# Patient Record
Sex: MALE | Race: WHITE | Employment: STUDENT | URBAN - METROPOLITAN AREA
[De-identification: names, ages, dates, MRNs, and addresses within clinical notes are randomized per-mention and may not be internally consistent; named-entity substitution may affect disease eponyms.]

---

## 2021-01-03 ENCOUNTER — HOSPITAL ENCOUNTER (OUTPATIENT)
Facility: CLINIC | Age: 26
Setting detail: OBSERVATION
Discharge: HOME | End: 2021-01-05
Attending: EMERGENCY MEDICINE | Admitting: ORTHOPAEDIC SURGERY
Payer: MEDICAID

## 2021-01-03 ENCOUNTER — APPOINTMENT (OUTPATIENT)
Dept: GENERAL RADIOLOGY | Facility: CLINIC | Age: 26
End: 2021-01-03
Attending: EMERGENCY MEDICINE
Payer: MEDICAID

## 2021-01-03 DIAGNOSIS — S82.892A CLOSED FRACTURE OF LEFT ANKLE, INITIAL ENCOUNTER: ICD-10-CM

## 2021-01-03 PROCEDURE — 73610 X-RAY EXAM OF ANKLE: CPT | Mod: LT

## 2021-01-03 PROCEDURE — 96361 HYDRATE IV INFUSION ADD-ON: CPT

## 2021-01-03 PROCEDURE — 27808 TREATMENT OF ANKLE FRACTURE: CPT | Mod: LT

## 2021-01-03 PROCEDURE — C9803 HOPD COVID-19 SPEC COLLECT: HCPCS

## 2021-01-03 PROCEDURE — 85025 COMPLETE CBC W/AUTO DIFF WBC: CPT | Performed by: EMERGENCY MEDICINE

## 2021-01-03 PROCEDURE — 87635 SARS-COV-2 COVID-19 AMP PRB: CPT | Performed by: EMERGENCY MEDICINE

## 2021-01-03 PROCEDURE — 99291 CRITICAL CARE FIRST HOUR: CPT | Mod: 25

## 2021-01-03 PROCEDURE — 96374 THER/PROPH/DIAG INJ IV PUSH: CPT

## 2021-01-03 PROCEDURE — 80048 BASIC METABOLIC PNL TOTAL CA: CPT | Performed by: EMERGENCY MEDICINE

## 2021-01-03 PROCEDURE — 250N000011 HC RX IP 250 OP 636: Performed by: EMERGENCY MEDICINE

## 2021-01-03 RX ORDER — PROPOFOL 10 MG/ML
0.1 INJECTION, EMULSION INTRAVENOUS
Status: DISCONTINUED | OUTPATIENT
Start: 2021-01-03 | End: 2021-01-04

## 2021-01-03 RX ORDER — NALOXONE HYDROCHLORIDE 0.4 MG/ML
0.4 INJECTION, SOLUTION INTRAMUSCULAR; INTRAVENOUS; SUBCUTANEOUS
Status: DISCONTINUED | OUTPATIENT
Start: 2021-01-03 | End: 2021-01-04

## 2021-01-03 RX ORDER — NALOXONE HYDROCHLORIDE 0.4 MG/ML
0.2 INJECTION, SOLUTION INTRAMUSCULAR; INTRAVENOUS; SUBCUTANEOUS
Status: DISCONTINUED | OUTPATIENT
Start: 2021-01-03 | End: 2021-01-04

## 2021-01-03 RX ORDER — PROPOFOL 10 MG/ML
1 INJECTION, EMULSION INTRAVENOUS ONCE
Status: COMPLETED | OUTPATIENT
Start: 2021-01-03 | End: 2021-01-04

## 2021-01-03 RX ADMIN — HYDROMORPHONE HYDROCHLORIDE 1 MG: 1 INJECTION, SOLUTION INTRAMUSCULAR; INTRAVENOUS; SUBCUTANEOUS at 21:38

## 2021-01-03 ASSESSMENT — ENCOUNTER SYMPTOMS
BACK PAIN: 0
ABDOMINAL PAIN: 0
NECK PAIN: 0

## 2021-01-04 ENCOUNTER — APPOINTMENT (OUTPATIENT)
Dept: GENERAL RADIOLOGY | Facility: CLINIC | Age: 26
End: 2021-01-04
Attending: ORTHOPAEDIC SURGERY
Payer: MEDICAID

## 2021-01-04 ENCOUNTER — ANESTHESIA (OUTPATIENT)
Dept: SURGERY | Facility: CLINIC | Age: 26
End: 2021-01-04
Payer: MEDICAID

## 2021-01-04 ENCOUNTER — ANESTHESIA EVENT (OUTPATIENT)
Dept: SURGERY | Facility: CLINIC | Age: 26
End: 2021-01-04
Payer: MEDICAID

## 2021-01-04 ENCOUNTER — APPOINTMENT (OUTPATIENT)
Dept: GENERAL RADIOLOGY | Facility: CLINIC | Age: 26
End: 2021-01-04
Attending: EMERGENCY MEDICINE
Payer: MEDICAID

## 2021-01-04 ENCOUNTER — APPOINTMENT (OUTPATIENT)
Dept: GENERAL RADIOLOGY | Facility: CLINIC | Age: 26
End: 2021-01-04
Attending: HOSPITALIST
Payer: MEDICAID

## 2021-01-04 PROBLEM — S82.892A CLOSED FRACTURE OF LEFT ANKLE, INITIAL ENCOUNTER: Status: ACTIVE | Noted: 2021-01-04

## 2021-01-04 LAB
ANION GAP SERPL CALCULATED.3IONS-SCNC: 4 MMOL/L (ref 3–14)
BASOPHILS # BLD AUTO: 0 10E9/L (ref 0–0.2)
BASOPHILS NFR BLD AUTO: 0.2 %
BUN SERPL-MCNC: 13 MG/DL (ref 7–30)
CALCIUM SERPL-MCNC: 8.7 MG/DL (ref 8.5–10.1)
CHLORIDE SERPL-SCNC: 108 MMOL/L (ref 94–109)
CO2 SERPL-SCNC: 25 MMOL/L (ref 20–32)
CREAT SERPL-MCNC: 0.9 MG/DL (ref 0.66–1.25)
CREAT SERPL-MCNC: 0.98 MG/DL (ref 0.66–1.25)
DIFFERENTIAL METHOD BLD: ABNORMAL
EOSINOPHIL # BLD AUTO: 0 10E9/L (ref 0–0.7)
EOSINOPHIL NFR BLD AUTO: 0.1 %
ERYTHROCYTE [DISTWIDTH] IN BLOOD BY AUTOMATED COUNT: 11.8 % (ref 10–15)
GFR SERPL CREATININE-BSD FRML MDRD: >90 ML/MIN/{1.73_M2}
GFR SERPL CREATININE-BSD FRML MDRD: >90 ML/MIN/{1.73_M2}
GLUCOSE SERPL-MCNC: 90 MG/DL (ref 70–99)
HCT VFR BLD AUTO: 40.8 % (ref 40–53)
HGB BLD-MCNC: 13.6 G/DL (ref 13.3–17.7)
IMM GRANULOCYTES # BLD: 0 10E9/L (ref 0–0.4)
IMM GRANULOCYTES NFR BLD: 0.3 %
LABORATORY COMMENT REPORT: NORMAL
LYMPHOCYTES # BLD AUTO: 1.5 10E9/L (ref 0.8–5.3)
LYMPHOCYTES NFR BLD AUTO: 11.5 %
MCH RBC QN AUTO: 31 PG (ref 26.5–33)
MCHC RBC AUTO-ENTMCNC: 33.3 G/DL (ref 31.5–36.5)
MCV RBC AUTO: 93 FL (ref 78–100)
MONOCYTES # BLD AUTO: 0.6 10E9/L (ref 0–1.3)
MONOCYTES NFR BLD AUTO: 5 %
NEUTROPHILS # BLD AUTO: 10.5 10E9/L (ref 1.6–8.3)
NEUTROPHILS NFR BLD AUTO: 82.9 %
NRBC # BLD AUTO: 0 10*3/UL
NRBC BLD AUTO-RTO: 0 /100
PLATELET # BLD AUTO: 294 10E9/L (ref 150–450)
POTASSIUM SERPL-SCNC: 3.6 MMOL/L (ref 3.4–5.3)
RBC # BLD AUTO: 4.39 10E12/L (ref 4.4–5.9)
SARS-COV-2 RNA SPEC QL NAA+PROBE: NEGATIVE
SODIUM SERPL-SCNC: 137 MMOL/L (ref 133–144)
SPECIMEN SOURCE: NORMAL
WBC # BLD AUTO: 12.6 10E9/L (ref 4–11)

## 2021-01-04 PROCEDURE — 250N000011 HC RX IP 250 OP 636: Performed by: EMERGENCY MEDICINE

## 2021-01-04 PROCEDURE — 272N000001 HC OR GENERAL SUPPLY STERILE: Performed by: ORTHOPAEDIC SURGERY

## 2021-01-04 PROCEDURE — 250N000013 HC RX MED GY IP 250 OP 250 PS 637: Performed by: HOSPITALIST

## 2021-01-04 PROCEDURE — 999N000065 XR ANKLE PORT LT 2 VW: Mod: LT

## 2021-01-04 PROCEDURE — 258N000003 HC RX IP 258 OP 636: Performed by: ANESTHESIOLOGY

## 2021-01-04 PROCEDURE — 250N000009 HC RX 250: Performed by: NURSE ANESTHETIST, CERTIFIED REGISTERED

## 2021-01-04 PROCEDURE — 250N000011 HC RX IP 250 OP 636: Performed by: ANESTHESIOLOGY

## 2021-01-04 PROCEDURE — 250N000011 HC RX IP 250 OP 636: Performed by: NURSE ANESTHETIST, CERTIFIED REGISTERED

## 2021-01-04 PROCEDURE — G0378 HOSPITAL OBSERVATION PER HR: HCPCS

## 2021-01-04 PROCEDURE — 999N000179 XR SURGERY CARM FLUORO LESS THAN 5 MIN W STILLS

## 2021-01-04 PROCEDURE — 36415 COLL VENOUS BLD VENIPUNCTURE: CPT | Performed by: ORTHOPAEDIC SURGERY

## 2021-01-04 PROCEDURE — C1769 GUIDE WIRE: HCPCS | Performed by: ORTHOPAEDIC SURGERY

## 2021-01-04 PROCEDURE — 258N000003 HC RX IP 258 OP 636: Performed by: EMERGENCY MEDICINE

## 2021-01-04 PROCEDURE — 258N000003 HC RX IP 258 OP 636: Performed by: ORTHOPAEDIC SURGERY

## 2021-01-04 PROCEDURE — 99220 PR INITIAL OBSERVATION CARE,LEVEL III: CPT | Performed by: HOSPITALIST

## 2021-01-04 PROCEDURE — 370N000017 HC ANESTHESIA TECHNICAL FEE, PER MIN: Performed by: ORTHOPAEDIC SURGERY

## 2021-01-04 PROCEDURE — 96375 TX/PRO/DX INJ NEW DRUG ADDON: CPT

## 2021-01-04 PROCEDURE — 250N000009 HC RX 250: Performed by: ORTHOPAEDIC SURGERY

## 2021-01-04 PROCEDURE — 360N000084 HC SURGERY LEVEL 4 W/ FLUORO, PER MIN: Performed by: ORTHOPAEDIC SURGERY

## 2021-01-04 PROCEDURE — 99207 PR CDG-MDM COMPONENT: MEETS HIGH - UP CODED: CPT | Performed by: HOSPITALIST

## 2021-01-04 PROCEDURE — C1713 ANCHOR/SCREW BN/BN,TIS/BN: HCPCS | Performed by: ORTHOPAEDIC SURGERY

## 2021-01-04 PROCEDURE — 250N000011 HC RX IP 250 OP 636: Performed by: HOSPITALIST

## 2021-01-04 PROCEDURE — 250N000013 HC RX MED GY IP 250 OP 250 PS 637: Performed by: ORTHOPAEDIC SURGERY

## 2021-01-04 PROCEDURE — 250N000011 HC RX IP 250 OP 636: Performed by: ORTHOPAEDIC SURGERY

## 2021-01-04 PROCEDURE — 250N000011 HC RX IP 250 OP 636: Performed by: PHYSICIAN ASSISTANT

## 2021-01-04 PROCEDURE — 82565 ASSAY OF CREATININE: CPT | Performed by: ORTHOPAEDIC SURGERY

## 2021-01-04 PROCEDURE — 710N000009 HC RECOVERY PHASE 1, LEVEL 1, PER MIN: Performed by: ORTHOPAEDIC SURGERY

## 2021-01-04 PROCEDURE — 999N000063 XR TIBIA & FIBULA PORT LT 2 VW: Mod: LT

## 2021-01-04 PROCEDURE — 250N000025 HC SEVOFLURANE, PER MIN: Performed by: ORTHOPAEDIC SURGERY

## 2021-01-04 PROCEDURE — 99207 PR NO CHARGE LOS: CPT | Performed by: STUDENT IN AN ORGANIZED HEALTH CARE EDUCATION/TRAINING PROGRAM

## 2021-01-04 PROCEDURE — 258N000003 HC RX IP 258 OP 636: Performed by: HOSPITALIST

## 2021-01-04 PROCEDURE — 271N000001 HC OR GENERAL SUPPLY NON-STERILE: Performed by: ORTHOPAEDIC SURGERY

## 2021-01-04 PROCEDURE — 96376 TX/PRO/DX INJ SAME DRUG ADON: CPT | Mod: 59

## 2021-01-04 PROCEDURE — 999N000141 HC STATISTIC PRE-PROCEDURE NURSING ASSESSMENT: Performed by: ORTHOPAEDIC SURGERY

## 2021-01-04 DEVICE — IMPLANTABLE DEVICE: Type: IMPLANTABLE DEVICE | Site: TIBIA | Status: FUNCTIONAL

## 2021-01-04 DEVICE — IMP SCR SYN LCP DIST 2.7X14MM SELF TAP SS 202.214: Type: IMPLANTABLE DEVICE | Site: FIBULA | Status: FUNCTIONAL

## 2021-01-04 DEVICE — IMP SCR SYN LCP DIST 2.7X16MM SELF TAP SS 202.216: Type: IMPLANTABLE DEVICE | Site: FIBULA | Status: FUNCTIONAL

## 2021-01-04 DEVICE — IMP SCR SYN CORTEX 3.5X14MM SELF TAP SS 204.814: Type: IMPLANTABLE DEVICE | Site: FIBULA | Status: FUNCTIONAL

## 2021-01-04 DEVICE — IMP SCR SYN LCP DIST 2.7X10MM SELF TAP SS 202.210: Type: IMPLANTABLE DEVICE | Site: FIBULA | Status: FUNCTIONAL

## 2021-01-04 DEVICE — IMP SCR SYN CORTEX 3.5X45MM SELF TAP SS 204.845: Type: IMPLANTABLE DEVICE | Site: TIBIA | Status: FUNCTIONAL

## 2021-01-04 DEVICE — IMPLANTABLE DEVICE: Type: IMPLANTABLE DEVICE | Site: FIBULA | Status: FUNCTIONAL

## 2021-01-04 DEVICE — IMP SCR SYN CORTEX T8 STARDRIVE 2.7X20MM SELF TAP 202.880: Type: IMPLANTABLE DEVICE | Site: TIBIA | Status: FUNCTIONAL

## 2021-01-04 RX ORDER — LABETALOL HYDROCHLORIDE 5 MG/ML
10 INJECTION, SOLUTION INTRAVENOUS
Status: DISCONTINUED | OUTPATIENT
Start: 2021-01-04 | End: 2021-01-04 | Stop reason: HOSPADM

## 2021-01-04 RX ORDER — CEFAZOLIN SODIUM 1 G/3ML
1 INJECTION, POWDER, FOR SOLUTION INTRAMUSCULAR; INTRAVENOUS SEE ADMIN INSTRUCTIONS
Status: DISCONTINUED | OUTPATIENT
Start: 2021-01-04 | End: 2021-01-04 | Stop reason: HOSPADM

## 2021-01-04 RX ORDER — CEFAZOLIN SODIUM 2 G/100ML
2 INJECTION, SOLUTION INTRAVENOUS
Status: COMPLETED | OUTPATIENT
Start: 2021-01-04 | End: 2021-01-04

## 2021-01-04 RX ORDER — OXYCODONE HYDROCHLORIDE 5 MG/1
5 TABLET ORAL
Status: DISCONTINUED | OUTPATIENT
Start: 2021-01-04 | End: 2021-01-05 | Stop reason: HOSPADM

## 2021-01-04 RX ORDER — AMOXICILLIN 250 MG
1 CAPSULE ORAL 2 TIMES DAILY
Status: DISCONTINUED | OUTPATIENT
Start: 2021-01-04 | End: 2021-01-05 | Stop reason: HOSPADM

## 2021-01-04 RX ORDER — FENTANYL CITRATE 50 UG/ML
25-50 INJECTION, SOLUTION INTRAMUSCULAR; INTRAVENOUS
Status: DISCONTINUED | OUTPATIENT
Start: 2021-01-04 | End: 2021-01-04 | Stop reason: HOSPADM

## 2021-01-04 RX ORDER — ACETAMINOPHEN 325 MG/1
650 TABLET ORAL EVERY 4 HOURS PRN
Status: DISCONTINUED | OUTPATIENT
Start: 2021-01-04 | End: 2021-01-05 | Stop reason: HOSPADM

## 2021-01-04 RX ORDER — VANCOMYCIN HYDROCHLORIDE 1 G/20ML
INJECTION, POWDER, LYOPHILIZED, FOR SOLUTION INTRAVENOUS PRN
Status: DISCONTINUED | OUTPATIENT
Start: 2021-01-04 | End: 2021-01-04 | Stop reason: HOSPADM

## 2021-01-04 RX ORDER — KETAMINE HYDROCHLORIDE 10 MG/ML
INJECTION INTRAMUSCULAR; INTRAVENOUS PRN
Status: DISCONTINUED | OUTPATIENT
Start: 2021-01-04 | End: 2021-01-04

## 2021-01-04 RX ORDER — LIDOCAINE HYDROCHLORIDE 20 MG/ML
INJECTION, SOLUTION INFILTRATION; PERINEURAL PRN
Status: DISCONTINUED | OUTPATIENT
Start: 2021-01-04 | End: 2021-01-04

## 2021-01-04 RX ORDER — HYDROMORPHONE HYDROCHLORIDE 1 MG/ML
.3-.5 INJECTION, SOLUTION INTRAMUSCULAR; INTRAVENOUS; SUBCUTANEOUS EVERY 5 MIN PRN
Status: DISCONTINUED | OUTPATIENT
Start: 2021-01-04 | End: 2021-01-04 | Stop reason: HOSPADM

## 2021-01-04 RX ORDER — BISACODYL 10 MG
10 SUPPOSITORY, RECTAL RECTAL DAILY PRN
Status: DISCONTINUED | OUTPATIENT
Start: 2021-01-04 | End: 2021-01-04

## 2021-01-04 RX ORDER — AMOXICILLIN 250 MG
1 CAPSULE ORAL 2 TIMES DAILY PRN
Status: DISCONTINUED | OUTPATIENT
Start: 2021-01-04 | End: 2021-01-05 | Stop reason: HOSPADM

## 2021-01-04 RX ORDER — PROCHLORPERAZINE MALEATE 10 MG
10 TABLET ORAL EVERY 6 HOURS PRN
Status: DISCONTINUED | OUTPATIENT
Start: 2021-01-04 | End: 2021-01-05 | Stop reason: HOSPADM

## 2021-01-04 RX ORDER — LIDOCAINE 40 MG/G
CREAM TOPICAL
Status: DISCONTINUED | OUTPATIENT
Start: 2021-01-04 | End: 2021-01-05 | Stop reason: HOSPADM

## 2021-01-04 RX ORDER — SODIUM CHLORIDE, SODIUM LACTATE, POTASSIUM CHLORIDE, CALCIUM CHLORIDE 600; 310; 30; 20 MG/100ML; MG/100ML; MG/100ML; MG/100ML
INJECTION, SOLUTION INTRAVENOUS CONTINUOUS
Status: DISCONTINUED | OUTPATIENT
Start: 2021-01-04 | End: 2021-01-04 | Stop reason: HOSPADM

## 2021-01-04 RX ORDER — LANOLIN ALCOHOL/MO/W.PET/CERES
3 CREAM (GRAM) TOPICAL
Status: DISCONTINUED | OUTPATIENT
Start: 2021-01-04 | End: 2021-01-05 | Stop reason: HOSPADM

## 2021-01-04 RX ORDER — POLYETHYLENE GLYCOL 3350 17 G/17G
17 POWDER, FOR SOLUTION ORAL DAILY PRN
Status: DISCONTINUED | OUTPATIENT
Start: 2021-01-04 | End: 2021-01-05 | Stop reason: HOSPADM

## 2021-01-04 RX ORDER — KETOROLAC TROMETHAMINE 15 MG/ML
15 INJECTION, SOLUTION INTRAMUSCULAR; INTRAVENOUS EVERY 6 HOURS PRN
Status: DISCONTINUED | OUTPATIENT
Start: 2021-01-04 | End: 2021-01-04

## 2021-01-04 RX ORDER — POLYETHYLENE GLYCOL 3350 17 G/17G
17 POWDER, FOR SOLUTION ORAL DAILY
Status: DISCONTINUED | OUTPATIENT
Start: 2021-01-05 | End: 2021-01-05 | Stop reason: HOSPADM

## 2021-01-04 RX ORDER — HYDROMORPHONE HYDROCHLORIDE 1 MG/ML
.2-.3 INJECTION, SOLUTION INTRAMUSCULAR; INTRAVENOUS; SUBCUTANEOUS
Status: DISCONTINUED | OUTPATIENT
Start: 2021-01-04 | End: 2021-01-04 | Stop reason: DRUGHIGH

## 2021-01-04 RX ORDER — PROCHLORPERAZINE 25 MG
25 SUPPOSITORY, RECTAL RECTAL EVERY 12 HOURS PRN
Status: DISCONTINUED | OUTPATIENT
Start: 2021-01-04 | End: 2021-01-05 | Stop reason: HOSPADM

## 2021-01-04 RX ORDER — SODIUM CHLORIDE, SODIUM LACTATE, POTASSIUM CHLORIDE, CALCIUM CHLORIDE 600; 310; 30; 20 MG/100ML; MG/100ML; MG/100ML; MG/100ML
INJECTION, SOLUTION INTRAVENOUS CONTINUOUS
Status: DISCONTINUED | OUTPATIENT
Start: 2021-01-04 | End: 2021-01-05 | Stop reason: HOSPADM

## 2021-01-04 RX ORDER — ACETAMINOPHEN 650 MG/1
650 SUPPOSITORY RECTAL EVERY 4 HOURS PRN
Status: DISCONTINUED | OUTPATIENT
Start: 2021-01-04 | End: 2021-01-05 | Stop reason: HOSPADM

## 2021-01-04 RX ORDER — MAGNESIUM HYDROXIDE 1200 MG/15ML
LIQUID ORAL PRN
Status: DISCONTINUED | OUTPATIENT
Start: 2021-01-04 | End: 2021-01-04 | Stop reason: HOSPADM

## 2021-01-04 RX ORDER — NALOXONE HYDROCHLORIDE 0.4 MG/ML
0.2 INJECTION, SOLUTION INTRAMUSCULAR; INTRAVENOUS; SUBCUTANEOUS
Status: DISCONTINUED | OUTPATIENT
Start: 2021-01-04 | End: 2021-01-05 | Stop reason: HOSPADM

## 2021-01-04 RX ORDER — ONDANSETRON 2 MG/ML
4 INJECTION INTRAMUSCULAR; INTRAVENOUS EVERY 30 MIN PRN
Status: DISCONTINUED | OUTPATIENT
Start: 2021-01-04 | End: 2021-01-04 | Stop reason: HOSPADM

## 2021-01-04 RX ORDER — HYDROMORPHONE HYDROCHLORIDE 1 MG/ML
0.4 INJECTION, SOLUTION INTRAMUSCULAR; INTRAVENOUS; SUBCUTANEOUS
Status: DISCONTINUED | OUTPATIENT
Start: 2021-01-04 | End: 2021-01-05 | Stop reason: HOSPADM

## 2021-01-04 RX ORDER — ONDANSETRON 4 MG/1
4 TABLET, ORALLY DISINTEGRATING ORAL EVERY 6 HOURS PRN
Status: DISCONTINUED | OUTPATIENT
Start: 2021-01-04 | End: 2021-01-04

## 2021-01-04 RX ORDER — PROCHLORPERAZINE MALEATE 10 MG
10 TABLET ORAL EVERY 6 HOURS PRN
Status: DISCONTINUED | OUTPATIENT
Start: 2021-01-04 | End: 2021-01-04

## 2021-01-04 RX ORDER — ONDANSETRON 4 MG/1
4 TABLET, ORALLY DISINTEGRATING ORAL EVERY 6 HOURS PRN
Status: DISCONTINUED | OUTPATIENT
Start: 2021-01-04 | End: 2021-01-05 | Stop reason: HOSPADM

## 2021-01-04 RX ORDER — NALOXONE HYDROCHLORIDE 0.4 MG/ML
0.4 INJECTION, SOLUTION INTRAMUSCULAR; INTRAVENOUS; SUBCUTANEOUS
Status: DISCONTINUED | OUTPATIENT
Start: 2021-01-04 | End: 2021-01-04

## 2021-01-04 RX ORDER — AMOXICILLIN 250 MG
2 CAPSULE ORAL 2 TIMES DAILY
Status: DISCONTINUED | OUTPATIENT
Start: 2021-01-04 | End: 2021-01-04

## 2021-01-04 RX ORDER — AMOXICILLIN 250 MG
1 CAPSULE ORAL 2 TIMES DAILY
Status: DISCONTINUED | OUTPATIENT
Start: 2021-01-04 | End: 2021-01-04

## 2021-01-04 RX ORDER — KETOROLAC TROMETHAMINE 30 MG/ML
INJECTION, SOLUTION INTRAMUSCULAR; INTRAVENOUS PRN
Status: DISCONTINUED | OUTPATIENT
Start: 2021-01-04 | End: 2021-01-04

## 2021-01-04 RX ORDER — KETOROLAC TROMETHAMINE 15 MG/ML
15 INJECTION, SOLUTION INTRAMUSCULAR; INTRAVENOUS EVERY 6 HOURS
Status: DISCONTINUED | OUTPATIENT
Start: 2021-01-04 | End: 2021-01-05 | Stop reason: HOSPADM

## 2021-01-04 RX ORDER — ONDANSETRON 2 MG/ML
4 INJECTION INTRAMUSCULAR; INTRAVENOUS EVERY 6 HOURS PRN
Status: DISCONTINUED | OUTPATIENT
Start: 2021-01-04 | End: 2021-01-04

## 2021-01-04 RX ORDER — POLYETHYLENE GLYCOL 3350 17 G/17G
17 POWDER, FOR SOLUTION ORAL DAILY
Status: DISCONTINUED | OUTPATIENT
Start: 2021-01-04 | End: 2021-01-04 | Stop reason: ALTCHOICE

## 2021-01-04 RX ORDER — OXYCODONE HYDROCHLORIDE 5 MG/1
10 TABLET ORAL EVERY 4 HOURS PRN
Status: DISCONTINUED | OUTPATIENT
Start: 2021-01-04 | End: 2021-01-05 | Stop reason: HOSPADM

## 2021-01-04 RX ORDER — CEFAZOLIN SODIUM 1 G/3ML
1 INJECTION, POWDER, FOR SOLUTION INTRAMUSCULAR; INTRAVENOUS EVERY 8 HOURS
Status: COMPLETED | OUTPATIENT
Start: 2021-01-04 | End: 2021-01-05

## 2021-01-04 RX ORDER — ONDANSETRON 4 MG/1
4 TABLET, ORALLY DISINTEGRATING ORAL EVERY 30 MIN PRN
Status: DISCONTINUED | OUTPATIENT
Start: 2021-01-04 | End: 2021-01-04 | Stop reason: HOSPADM

## 2021-01-04 RX ORDER — HYDROXYZINE HYDROCHLORIDE 25 MG/1
25 TABLET, FILM COATED ORAL EVERY 6 HOURS PRN
Status: DISCONTINUED | OUTPATIENT
Start: 2021-01-04 | End: 2021-01-05 | Stop reason: HOSPADM

## 2021-01-04 RX ORDER — BISACODYL 10 MG
10 SUPPOSITORY, RECTAL RECTAL DAILY PRN
Status: DISCONTINUED | OUTPATIENT
Start: 2021-01-04 | End: 2021-01-05 | Stop reason: HOSPADM

## 2021-01-04 RX ORDER — ONDANSETRON 2 MG/ML
INJECTION INTRAMUSCULAR; INTRAVENOUS PRN
Status: DISCONTINUED | OUTPATIENT
Start: 2021-01-04 | End: 2021-01-04

## 2021-01-04 RX ORDER — AMOXICILLIN 250 MG
2 CAPSULE ORAL 2 TIMES DAILY PRN
Status: DISCONTINUED | OUTPATIENT
Start: 2021-01-04 | End: 2021-01-05 | Stop reason: HOSPADM

## 2021-01-04 RX ORDER — ONDANSETRON 2 MG/ML
4 INJECTION INTRAMUSCULAR; INTRAVENOUS EVERY 6 HOURS PRN
Status: DISCONTINUED | OUTPATIENT
Start: 2021-01-04 | End: 2021-01-05 | Stop reason: HOSPADM

## 2021-01-04 RX ORDER — FENTANYL CITRATE 50 UG/ML
50 INJECTION, SOLUTION INTRAMUSCULAR; INTRAVENOUS EVERY 5 MIN PRN
Status: DISCONTINUED | OUTPATIENT
Start: 2021-01-04 | End: 2021-01-04 | Stop reason: HOSPADM

## 2021-01-04 RX ORDER — DEXAMETHASONE SODIUM PHOSPHATE 4 MG/ML
INJECTION, SOLUTION INTRA-ARTICULAR; INTRALESIONAL; INTRAMUSCULAR; INTRAVENOUS; SOFT TISSUE PRN
Status: DISCONTINUED | OUTPATIENT
Start: 2021-01-04 | End: 2021-01-04

## 2021-01-04 RX ORDER — HYDROMORPHONE HYDROCHLORIDE 1 MG/ML
0.2 INJECTION, SOLUTION INTRAMUSCULAR; INTRAVENOUS; SUBCUTANEOUS
Status: DISCONTINUED | OUTPATIENT
Start: 2021-01-04 | End: 2021-01-05 | Stop reason: HOSPADM

## 2021-01-04 RX ORDER — BUPIVACAINE HYDROCHLORIDE AND EPINEPHRINE 5; 5 MG/ML; UG/ML
INJECTION, SOLUTION EPIDURAL; INTRACAUDAL; PERINEURAL PRN
Status: DISCONTINUED | OUTPATIENT
Start: 2021-01-04 | End: 2021-01-04 | Stop reason: HOSPADM

## 2021-01-04 RX ORDER — OXYCODONE HYDROCHLORIDE 5 MG/1
5-10 TABLET ORAL
Status: DISCONTINUED | OUTPATIENT
Start: 2021-01-04 | End: 2021-01-04 | Stop reason: DRUGHIGH

## 2021-01-04 RX ORDER — SODIUM CHLORIDE 9 MG/ML
INJECTION, SOLUTION INTRAVENOUS CONTINUOUS
Status: DISCONTINUED | OUTPATIENT
Start: 2021-01-04 | End: 2021-01-04 | Stop reason: ALTCHOICE

## 2021-01-04 RX ORDER — DOCUSATE SODIUM 100 MG/1
100 CAPSULE, LIQUID FILLED ORAL 2 TIMES DAILY
Status: DISCONTINUED | OUTPATIENT
Start: 2021-01-04 | End: 2021-01-05 | Stop reason: HOSPADM

## 2021-01-04 RX ADMIN — HYDROMORPHONE HYDROCHLORIDE 0.3 MG: 1 INJECTION, SOLUTION INTRAMUSCULAR; INTRAVENOUS; SUBCUTANEOUS at 05:09

## 2021-01-04 RX ADMIN — HYDROMORPHONE HYDROCHLORIDE 0.4 MG: 1 INJECTION, SOLUTION INTRAMUSCULAR; INTRAVENOUS; SUBCUTANEOUS at 22:45

## 2021-01-04 RX ADMIN — LIDOCAINE HYDROCHLORIDE 100 MG: 20 INJECTION, SOLUTION INFILTRATION; PERINEURAL at 11:53

## 2021-01-04 RX ADMIN — DOCUSATE SODIUM 100 MG: 100 CAPSULE, LIQUID FILLED ORAL at 21:04

## 2021-01-04 RX ADMIN — SODIUM CHLORIDE: 9 INJECTION, SOLUTION INTRAVENOUS at 01:44

## 2021-01-04 RX ADMIN — SUGAMMADEX 150 MG: 100 INJECTION, SOLUTION INTRAVENOUS at 15:21

## 2021-01-04 RX ADMIN — SODIUM CHLORIDE 1000 ML: 9 INJECTION, SOLUTION INTRAVENOUS at 00:07

## 2021-01-04 RX ADMIN — HYDROMORPHONE HYDROCHLORIDE 0.5 MG: 1 INJECTION, SOLUTION INTRAMUSCULAR; INTRAVENOUS; SUBCUTANEOUS at 18:05

## 2021-01-04 RX ADMIN — SODIUM CHLORIDE: 9 INJECTION, SOLUTION INTRAVENOUS at 10:14

## 2021-01-04 RX ADMIN — ROCURONIUM BROMIDE 50 MG: 10 INJECTION INTRAVENOUS at 11:53

## 2021-01-04 RX ADMIN — Medication 30 MG: at 12:11

## 2021-01-04 RX ADMIN — HYDROMORPHONE HYDROCHLORIDE 0.5 MG: 1 INJECTION, SOLUTION INTRAMUSCULAR; INTRAVENOUS; SUBCUTANEOUS at 14:40

## 2021-01-04 RX ADMIN — SODIUM CHLORIDE, POTASSIUM CHLORIDE, SODIUM LACTATE AND CALCIUM CHLORIDE: 600; 310; 30; 20 INJECTION, SOLUTION INTRAVENOUS at 15:32

## 2021-01-04 RX ADMIN — SODIUM CHLORIDE, POTASSIUM CHLORIDE, SODIUM LACTATE AND CALCIUM CHLORIDE: 600; 310; 30; 20 INJECTION, SOLUTION INTRAVENOUS at 11:24

## 2021-01-04 RX ADMIN — ONDANSETRON 4 MG: 2 INJECTION INTRAMUSCULAR; INTRAVENOUS at 17:23

## 2021-01-04 RX ADMIN — ONDANSETRON 4 MG: 2 INJECTION INTRAMUSCULAR; INTRAVENOUS at 15:18

## 2021-01-04 RX ADMIN — CEFAZOLIN SODIUM 2 G: 2 INJECTION, SOLUTION INTRAVENOUS at 12:00

## 2021-01-04 RX ADMIN — KETOROLAC TROMETHAMINE 15 MG: 15 INJECTION, SOLUTION INTRAMUSCULAR; INTRAVENOUS at 21:04

## 2021-01-04 RX ADMIN — ACETAMINOPHEN 650 MG: 325 TABLET, FILM COATED ORAL at 05:57

## 2021-01-04 RX ADMIN — SODIUM CHLORIDE, POTASSIUM CHLORIDE, SODIUM LACTATE AND CALCIUM CHLORIDE: 600; 310; 30; 20 INJECTION, SOLUTION INTRAVENOUS at 19:41

## 2021-01-04 RX ADMIN — FENTANYL CITRATE 100 MCG: 50 INJECTION, SOLUTION INTRAMUSCULAR; INTRAVENOUS at 11:53

## 2021-01-04 RX ADMIN — HYDROMORPHONE HYDROCHLORIDE 0.5 MG: 1 INJECTION, SOLUTION INTRAMUSCULAR; INTRAVENOUS; SUBCUTANEOUS at 12:04

## 2021-01-04 RX ADMIN — DOCUSATE SODIUM 50 MG AND SENNOSIDES 8.6 MG 1 TABLET: 8.6; 5 TABLET, FILM COATED ORAL at 21:04

## 2021-01-04 RX ADMIN — FENTANYL CITRATE 50 MCG: 0.05 INJECTION, SOLUTION INTRAMUSCULAR; INTRAVENOUS at 16:43

## 2021-01-04 RX ADMIN — PROPOFOL 220 MG: 10 INJECTION, EMULSION INTRAVENOUS at 00:07

## 2021-01-04 RX ADMIN — SODIUM CHLORIDE, POTASSIUM CHLORIDE, SODIUM LACTATE AND CALCIUM CHLORIDE: 600; 310; 30; 20 INJECTION, SOLUTION INTRAVENOUS at 11:44

## 2021-01-04 RX ADMIN — ACETAMINOPHEN 650 MG: 325 TABLET, FILM COATED ORAL at 01:58

## 2021-01-04 RX ADMIN — HYDROMORPHONE HYDROCHLORIDE 0.5 MG: 1 INJECTION, SOLUTION INTRAMUSCULAR; INTRAVENOUS; SUBCUTANEOUS at 16:52

## 2021-01-04 RX ADMIN — CEFAZOLIN SODIUM 1 G: 2 INJECTION, SOLUTION INTRAVENOUS at 14:00

## 2021-01-04 RX ADMIN — HYDROMORPHONE HYDROCHLORIDE 0.3 MG: 1 INJECTION, SOLUTION INTRAMUSCULAR; INTRAVENOUS; SUBCUTANEOUS at 01:58

## 2021-01-04 RX ADMIN — KETOROLAC TROMETHAMINE 15 MG: 15 INJECTION, SOLUTION INTRAMUSCULAR; INTRAVENOUS at 05:12

## 2021-01-04 RX ADMIN — CEFAZOLIN 1 G: 1 INJECTION, POWDER, FOR SOLUTION INTRAMUSCULAR; INTRAVENOUS at 21:04

## 2021-01-04 RX ADMIN — KETOROLAC TROMETHAMINE 30 MG: 30 INJECTION, SOLUTION INTRAMUSCULAR at 15:07

## 2021-01-04 RX ADMIN — HYDROMORPHONE HYDROCHLORIDE 0.3 MG: 1 INJECTION, SOLUTION INTRAMUSCULAR; INTRAVENOUS; SUBCUTANEOUS at 07:26

## 2021-01-04 RX ADMIN — DEXAMETHASONE SODIUM PHOSPHATE 8 MG: 4 INJECTION, SOLUTION INTRA-ARTICULAR; INTRALESIONAL; INTRAMUSCULAR; INTRAVENOUS; SOFT TISSUE at 12:00

## 2021-01-04 RX ADMIN — PROPOFOL 200 MG: 10 INJECTION, EMULSION INTRAVENOUS at 11:53

## 2021-01-04 SDOH — HEALTH STABILITY: MENTAL HEALTH: HOW OFTEN DO YOU HAVE 6 OR MORE DRINKS ON ONE OCCASION?: NOT ASKED

## 2021-01-04 SDOH — HEALTH STABILITY: MENTAL HEALTH: HOW MANY STANDARD DRINKS CONTAINING ALCOHOL DO YOU HAVE ON A TYPICAL DAY?: NOT ASKED

## 2021-01-04 SDOH — HEALTH STABILITY: MENTAL HEALTH: HOW OFTEN DO YOU HAVE A DRINK CONTAINING ALCOHOL?: NOT ASKED

## 2021-01-04 ASSESSMENT — MIFFLIN-ST. JEOR
SCORE: 1767.36
SCORE: 1767.36

## 2021-01-04 NOTE — ANESTHESIA PREPROCEDURE EVALUATION
"Anesthesia Pre-Procedure Evaluation    Patient: Mario Alberto Mcgarry   MRN: [id number] : 1995          Preoperative Diagnosis: Tibia/fibula fracture [S82.209A, S82.409A]    Procedure(s):  Open reduction internal fixation left tibia    History reviewed. No pertinent past medical history.  History reviewed. No pertinent surgical history.    Anesthesia Evaluation     .             ROS/MED HX    ENT/Pulmonary: Comment: vapes     (-) sleep apnea   Neurologic:       Cardiovascular:         METS/Exercise Tolerance:     Hematologic:         Musculoskeletal:   (+) fracture lower extremity: Other (Comment), -       GI/Hepatic:        (-) GERD   Renal/Genitourinary:         Endo:         Psychiatric:         Infectious Disease:         Malignancy:         Other:                          Physical Exam  Normal systems: cardiovascular, pulmonary and dental    Airway   Mallampati: II  TM distance: >3 FB  Neck ROM: full    Dental     Cardiovascular       Pulmonary             Lab Results   Component Value Date    WBC 12.6 (H) 2021    HGB 13.6 2021    HCT 40.8 2021     2021     2021    POTASSIUM 3.6 2021    CHLORIDE 108 2021    CO2 25 2021    BUN 13 2021    CR 0.98 2021    GLC 90 2021    OLGA 8.7 2021       Preop Vitals  BP Readings from Last 3 Encounters:   21 131/89    Pulse Readings from Last 3 Encounters:   21 63   11 78      Resp Readings from Last 3 Encounters:   21 16   11 16    SpO2 Readings from Last 3 Encounters:   21 99%      Temp Readings from Last 1 Encounters:   21 36.4  C (97.6  F) (Temporal)    Ht Readings from Last 1 Encounters:   21 1.778 m (5' 10\")      Wt Readings from Last 1 Encounters:   21 77.1 kg (170 lb)    Estimated body mass index is 24.39 kg/m  as calculated from the following:    Height as of this encounter: 1.778 m (5' 10\").    Weight as of this encounter: 77.1 " kg (170 lb).       Anesthesia Plan      History & Physical Review      ASA Status:  1 .    NPO Status:  > 8 hours    Plan for General with Intravenous and Propofol induction. Maintenance will be Inhalation.    PONV prophylaxis:  Ondansetron (or other 5HT-3) and Dexamethasone or Solumedrol         Postoperative Care  Postoperative pain management:  IV analgesics and Oral pain medications.      Consents  Anesthetic plan, risks, benefits and alternatives discussed with:  Patient..                 Daniel Augustin MD

## 2021-01-04 NOTE — ED PROVIDER NOTES
History     Chief Complaint:  Ankle Pain     HPI  Mario Alberto Mcgarry is a 25 year old male who presents for evaluation of left ankle injury. The patient states that he was snowboarding and went off a jump of unknown height then crashed without a helmet. He reports that after the fall he had pain in his left ankle which was addressed by  and immobilized. He states that he had 3-4 drinks before snowboarding today.     He denies hitting his head. He does not have any pain in his abdomen, chest, back, or neck.       Review of Systems   Cardiovascular: Negative for chest pain.   Gastrointestinal: Negative for abdominal pain.   Musculoskeletal: Negative for back pain and neck pain.        Left ankle pain   All other systems reviewed and are negative.      Allergies:  No Known Allergies     Medications:   Patient does not take medications    Medical History:   No noted medical history noted    Surgical History   No past surgical history noted.      Family History:   No known family medical history noted.     Social History:  Patient presents alone.  Patient uses alcohol.   Uses nicotine.   Denies any drug use.   Lives with his twin brother.     Physical Exam     Patient Vitals for the past 24 hrs:   BP Temp Temp src Pulse SpO2   01/03/21 2111 (!) 139/92 98.7  F (37.1  C) Oral 96 95 %          Physical Exam   Gen:  Pleasant, appears stated age.    Head: Atraumatic, nontender.    Eye:   Sclera non-injected.  Pupils equal round and reactive to light.    ENT: Moist mucous membranes, oropharynx clear, bifid uvula.    Pulmonary: Lungs clear to auscultation bilaterally.  No increased work of breathing.    Cardiac: Heart normal rate regular rhythm no murmurs rubs or gallops.    Extremity Exam: Full AROM of all joints without pain except the following:  Inspection:  Obvious deformity to left lower leg, no laceration but bruising over medial ankle.   Palpation: Exquisitely tender to light palpation.  Knee is  nontender.  ROM: Able to wiggle toes, unable to range ankle.  Full range of motion in the left knee.  Sensation: Fine touch sensation intact in the left foot  Distal Pulses: intact 2+ DP, CR < 2 seconds    Neurologic:    Non-focal exam without asymmetric weakness or numbness.    Fluent speech.  Symmetric smile.    Psychiatric:     Normal affect with appropriate interaction with examiner.    Emergency Department Course     Imaging:    XR Ankle Left 3 Views  Oblique fracture distal tibia with displacement of 10 mm. There is also a comminuted, displaced distal fibular fracture extending to the ankle joint. Ankle mortise remains symmetric. Surrounding soft tissue swelling.    Reading per radiology     XR Ankle Left 3 Views  IMPRESSION: Oblique spiral type mildly displaced fracture distal left tibial shaft with slight lateral displacement of the distal fracture fragment. Alignment is improved compared with the previous study. Oblique comminuted mildly displaced fracture   distal left fibular shaft. Ankle mortise is intact. Overlapping cast material obscures some detail.  Reading per radiology     [employer]    -Fracture    Date/Time: 1/4/2021 1:00 AM  Performed by: Isabel Stanford MD  Authorized by: Isabel Stanford MD     UNIVERSAL PROTOCOL   Site Marked: No  Prior Images Obtained and Reviewed:  Yes  Required items: Required blood products, implants, devices and special equipment available    Patient identity confirmed:  Verbally with patient and arm band  Patient was reevaluated immediately before administering moderate or deep sedation or anesthesia  Confirmation Checklist:  Patient's identity using two indicators and relevant allergies  Time out: Immediately prior to the procedure a time out was called    Universal Protocol: the Joint Commission Universal Protocol was followed          INJURY      Injury location:  Ankle    Ankle injury location:  L ankle    PRE PROCEDURE ASSESSMENT       Neurological function: normal      Distal perfusion: normal      Range of motion: reduced      PROCEDURE DETAILS:     Manipulation performed: yes      Skeletal traction used: yes      Reduction successful: yes      X-ray confirmed reduction: yes      Immobilization:  Splint    Splint type:  Ankle stirrup    Supplies used:  Plaster    POST PROCEDURE ASSESSMENT      Neurological function: normal      Distal perfusion: normal      PROCEDURE   Patient Tolerance:  Patient tolerated the procedure well with no immediate complications               Splint Placement     Splint was applied and after placement I checked and adjusted the fit to ensure proper positioning. Patient was more comfortable with splint in place. Sensation and circulation are intact after splint placement.       Emergency Department Course:    Reviewed:  I reviewed the patient's nursing notes, vitals, past medical records, Care Everywhere.     Assessments:  2116 I assessed the patient as above.   2345 I performed the ankle dislocation reduction procedure as documented above.     Consults:   2330 I spoke with Dr. Smith of the orthopedic service from [facility] regarding patient's presentation, findings, and plan of care. He recommends 3-way posterior splint, elevation of leg on a foam ramp, NPO after midnight, likely to the OR tomorrow around noon.    Interventions:  2138 Dilaudid 1.0 mg IV   2345 propofol 220 mg    Disposition:  I discussed the patient and plan of care with Dr. Chino.  Admitted to Dr. Chino.    Impression & Plan     Medical Decision Making:  Mario Alberto Mcgarry is a 25 year old otherwise healthy male who presents following a snowboarding injury.  The patient went off a ramp, and when he landed, was unable to bear any weight on his left leg.  He denies other injuries.  He was unhelmeted.  He does admit to drinking some alcohol today before snowboarding.  On exam, he is neurovascularly intact both obvious deformity to the left lower  extremity.  Radiographs demonstrate a oblique metaphyseal tibial fracture with displacement as well as a comminuted distal tibia fracture.  The patient was sedated for reduction and splint placement.  He will be admitted to the hospital service for pain control and orthopedic fixation tomorrow.      Diagnosis:     ICD-10-CM    1. Closed fracture of left ankle, initial encounter  S82.892A         Disposition:  Admitted to Dr. Chino.    Scribe Disclosure:  I, Antelmo Tomlinson, am serving as a scribe at 9:23 PM on 1/3/2021 to document services personally performed by Isabel Stanford MD based on my observations and the provider's statements to me.     [facility]  January 3, 2021      Isabel Stanford MD  01/04/21 0105

## 2021-01-04 NOTE — CONSULTS
[facility]    Orthopedic Consultation    Mario Alberto Mcgarry MRN# [id number]   Age: 25 year old YOB: 1995     Date of Admission: 1/3/2021    Reason for consult: Left tibia/fibula fracture       Requesting physician: Vahid Chino       Level of consult: Consult, follow and place orders           Assessment and Plan:   Assessment:   Left tibia/fibula fracture        Plan:   Patient scheduled for an ORIF left tibia fracture later today.   Surgeon Dr Zhong  NPO Status effective now   NWB/Bedrest until postop  Stat covid testing: negative   Pain medication as needed           Chief Complaint:   Left low leg pain          History of Present Illness:   Mario Alberto Mcgarry is a markedly pleasant 25 year old gentleman who presented to the ED with severe pain in the left ankle and lower leg since he fell snowboarding last night.  He reports that he landed awkwardly on the left ankle. The pain was exacerbated by attempts at weight bearing and only partially relieved with rest.  Xrays confirmed: Oblique spiral type mildly displaced fracture distal left tibial shaft with slight lateral displacement of the distal fracture fragment. Oblique comminuted mildly displaced fracture distal left fibular shaft.  The fractures were reduced and low leg splint applied in the ED.   He denies any prior surgeries.  Ambulates independently at baseline.            Past Medical History:   No past medical history on file.          Past Surgical History:   No past surgical history on file.          Social History:     Social History     Tobacco Use     Smoking status: Never Smoker     Tobacco comment: He vapes; no THC   Substance Use Topics     Alcohol use: Yes             Family History:   No family history on file.          Immunizations:     VACCINE/DOSE   Diptheria   DPT   DTAP   HBIG   Hepatitis A   Hepatitis B   HIB   Influenza   Measles   Meningococcal   MMR   Mumps   Pneumococcal   Polio   Rubella  "  Small Pox   TDAP   Varicella   Zoster             Allergies:   No Known Allergies          Medications:     Current Facility-Administered Medications   Medication     acetaminophen (TYLENOL) Suppository 650 mg     acetaminophen (TYLENOL) tablet 650 mg     bisacodyl (DULCOLAX) Suppository 10 mg     HYDROmorphone (PF) (DILAUDID) injection 0.2-0.3 mg     ketorolac (TORADOL) injection 15 mg     lidocaine (LMX4) cream     lidocaine 1 % 0.1-1 mL     melatonin tablet 3 mg     naloxone (NARCAN) injection 0.2 mg     naloxone (NARCAN) injection 0.4 mg     ondansetron (ZOFRAN-ODT) ODT tab 4 mg    Or     ondansetron (ZOFRAN) injection 4 mg     oxyCODONE (ROXICODONE) tablet 5-10 mg     polyethylene glycol (MIRALAX) Packet 17 g     polyethylene glycol (MIRALAX) Packet 17 g     prochlorperazine (COMPAZINE) injection 10 mg    Or     prochlorperazine (COMPAZINE) tablet 10 mg    Or     prochlorperazine (COMPAZINE) suppository 25 mg     propofol (DIPRIVAN) injection 10 mg/mL vial     senna-docusate (SENOKOT-S/PERICOLACE) 8.6-50 MG per tablet 1 tablet    Or     senna-docusate (SENOKOT-S/PERICOLACE) 8.6-50 MG per tablet 2 tablet     senna-docusate (SENOKOT-S/PERICOLACE) 8.6-50 MG per tablet 1 tablet    Or     senna-docusate (SENOKOT-S/PERICOLACE) 8.6-50 MG per tablet 2 tablet     sodium chloride (PF) 0.9% PF flush 3 mL     sodium chloride (PF) 0.9% PF flush 3 mL     sodium chloride 0.9% infusion             Review of Systems:   ROS:  10 point ROS neg other than the symptoms noted above in the HPI.          Physical Exam:   All vitals have been reviewed  Patient Vitals for the past 24 hrs:   BP Temp Temp src Pulse Resp SpO2 Height Weight   01/04/21 0725 123/67 96.3  F (35.7  C) Oral 66 20 96 % -- --   01/04/21 0513 134/69 98.2  F (36.8  C) Oral 69 18 96 % -- --   01/04/21 0220 -- -- -- -- 16 94 % -- --   01/04/21 0136 124/60 100.1  F (37.8  C) Oral 87 16 96 % 1.778 m (5' 10\") 77.1 kg (170 lb)   01/04/21 0020 114/71 -- -- 78 14 100 % -- " --   01/04/21 0015 119/70 -- -- 73 26 100 % -- --   01/04/21 0000 120/61 -- -- 73 24 95 % -- --   01/03/21 2345 123/74 -- -- 85 13 96 % -- --   01/03/21 2330 111/73 -- -- 90 12 98 % -- --   01/03/21 2300 -- -- -- -- -- -- -- 74.8 kg (165 lb)   01/03/21 2111 (!) 139/92 98.7  F (37.1  C) Oral 96 -- 95 % -- --       Intake/Output Summary (Last 24 hours) at 1/4/2021 0906  Last data filed at 1/4/2021 0518  Gross per 24 hour   Intake 225 ml   Output --   Net 225 ml         Physical Exam   Temp: 96.3  F (35.7  C) Temp src: Oral BP: 123/67 Pulse: 66   Resp: 20 SpO2: 96 % O2 Device: None (Room air)    Vital Signs with Ranges  Temp:  [96.3  F (35.7  C)-100.1  F (37.8  C)] 96.3  F (35.7  C)  Pulse:  [66-96] 66  Resp:  [12-26] 20  BP: (111-139)/(60-92) 123/67  SpO2:  [94 %-100 %] 96 %  170 lbs 0 oz    Constitutional: Pleasant, alert, appropriate, following commands.  HEENT: Head atraumatic normocephalic. Pupils equal round and reactive to light.  Respiratory: Unlabored breathing no audible wheeze  Cardiovascular: Regular rate and rhythm  GI: Abdomen soft nontender nondistended.  Lymph/Hematologic: No lymphadenopathy in areas examined  Genitourinary:  No garcia  Skin: No rashes, no cyanosis, no edema.  Musculoskeletal: see belwo  Neurologic: normal without focal findings, mental status, speech normal, alert and oriented x iii, DMITRY  Neuropsychiatric: stable    On physical exam of the left low leg  SLS applied to left LE  Tenderness to palpation distal tibia/fibula  ROM: able to flex and extend toes  Calves:  Proximal calf: Soft and non-tender  Distal pulses are intact and equal bilaterally  Sensation to light touch intact and equal bilaterally.             Data:   All laboratory data reviewed  Results for orders placed or performed during the hospital encounter of 01/03/21   -Fracture     Status: None    Narrative    Isabel Stanford MD     1/4/2021  1:05 AM  [facility]    -Fracture    Date/Time:  1/4/2021 1:00 AM  Performed by: Isabel Stanford MD  Authorized by: Isabel Stanford MD     UNIVERSAL PROTOCOL   Site Marked: No  Prior Images Obtained and Reviewed:  Yes  Required items: Required blood products, implants, devices and special   equipment available    Patient identity confirmed:  Verbally with patient and arm band  Patient was reevaluated immediately before administering moderate or deep   sedation or anesthesia  Confirmation Checklist:  Patient's identity using two indicators and   relevant allergies  Time out: Immediately prior to the procedure a time out was called    Universal Protocol: the Joint Commission Universal Protocol was followed          INJURY      Injury location:  Ankle    Ankle injury location:  L ankle    PRE PROCEDURE ASSESSMENT      Neurological function: normal      Distal perfusion: normal      Range of motion: reduced      PROCEDURE DETAILS:     Manipulation performed: yes      Skeletal traction used: yes      Reduction successful: yes      X-ray confirmed reduction: yes      Immobilization:  Splint    Splint type:  Ankle stirrup    Supplies used:  Plaster    POST PROCEDURE ASSESSMENT      Neurological function: normal      Distal perfusion: normal      PROCEDURE   Patient Tolerance:  Patient tolerated the procedure well with no immediate   complications     XR Ankle Left 3 Views     Status: None    Narrative    EXAM: XR ANKLE LT G/E 3 VW  LOCATION: [facility]  DATE/TIME: 1/3/2021 9:40 PM    INDICATION: Unstable left ankle. Pain.  COMPARISON: None.      Impression    IMPRESSION: Oblique fracture distal tibia with displacement of 10 mm. There is also a comminuted, displaced distal fibular fracture extending to the ankle joint. Ankle mortise remains symmetric. Surrounding soft tissue swelling.   XR Ankle Port Left 2 Views     Status: None    Narrative    EXAM: XR ANKLE PORT LT 2 VW  LOCATION: [facility]  DATE/TIME: 1/4/2021 12:26  AM    INDICATION: Post reduction.  COMPARISON: 1/3/2021.      Impression    IMPRESSION: Oblique spiral type mildly displaced fracture distal left tibial shaft with slight lateral displacement of the distal fracture fragment. Alignment is improved compared with the previous study. Oblique comminuted mildly displaced fracture   distal left fibular shaft. Ankle mortise is intact. Overlapping cast material obscures some detail.   Asymptomatic SARS-CoV-2 COVID-19 Virus (Coronavirus) by PCR     Status: None    Specimen: Nasopharyngeal   Result Value Ref Range    SARS-CoV-2 Virus Specimen Source Nasopharyngeal     SARS-CoV-2 PCR Result NEGATIVE     SARS-CoV-2 PCR Comment (Note)    CBC with platelets differential     Status: Abnormal   Result Value Ref Range    WBC 12.6 (H) 4.0 - 11.0 10e9/L    RBC Count 4.39 (L) 4.4 - 5.9 10e12/L    Hemoglobin 13.6 13.3 - 17.7 g/dL    Hematocrit 40.8 40.0 - 53.0 %    MCV 93 78 - 100 fl    MCH 31.0 26.5 - 33.0 pg    MCHC 33.3 31.5 - 36.5 g/dL    RDW 11.8 10.0 - 15.0 %    Platelet Count 294 150 - 450 10e9/L    Diff Method Automated Method     % Neutrophils 82.9 %    % Lymphocytes 11.5 %    % Monocytes 5.0 %    % Eosinophils 0.1 %    % Basophils 0.2 %    % Immature Granulocytes 0.3 %    Nucleated RBCs 0 0 /100    Absolute Neutrophil 10.5 (H) 1.6 - 8.3 10e9/L    Absolute Lymphocytes 1.5 0.8 - 5.3 10e9/L    Absolute Monocytes 0.6 0.0 - 1.3 10e9/L    Absolute Eosinophils 0.0 0.0 - 0.7 10e9/L    Absolute Basophils 0.0 0.0 - 0.2 10e9/L    Abs Immature Granulocytes 0.0 0 - 0.4 10e9/L    Absolute Nucleated RBC 0.0    Basic metabolic panel     Status: None   Result Value Ref Range    Sodium 137 133 - 144 mmol/L    Potassium 3.6 3.4 - 5.3 mmol/L    Chloride 108 94 - 109 mmol/L    Carbon Dioxide 25 20 - 32 mmol/L    Anion Gap 4 3 - 14 mmol/L    Glucose 90 70 - 99 mg/dL    Urea Nitrogen 13 7 - 30 mg/dL    Creatinine 0.98 0.66 - 1.25 mg/dL    GFR Estimate >90 >60 mL/min/[1.73_m2]    GFR Estimate If Black  >90 >60 mL/min/[1.73_m2]    Calcium 8.7 8.5 - 10.1 mg/dL          Attestation:  I have reviewed today's vital signs, notes, medications, labs and imaging with Dr. Zhong.  Amount of time performed on this consult:  30 minutes.    Marianne Harris PA-C

## 2021-01-04 NOTE — ED NOTES
[facility]  ED Nurse Handoff Report    ED Chief complaint: Ankle Pain      ED Diagnosis:   Final diagnoses:   Closed fracture of left ankle, initial encounter       Code Status: Full Code    Allergies: No Known Allergies    Patient Story: snowboarding fall  Focused Assessment:  Pt landed on jump from snowboarding, unable to stand after landing. Pt still had both boots attached to board. Pt has spiral Fx of left ankle. Ortho to take to surgery tomorrow.     Treatments and/or interventions provided: IVF, see MAR  Patient's response to treatments and/or interventions: good    To be done/followed up on inpatient unit:  go to surgery    Does this patient have any cognitive concerns?: none    Activity level - Baseline/Home:  Independent  Activity Level - Current:   Stand with assist x2    Patient's Preferred language: English   Needed?: No    Isolation: None  Infection: Not Applicable  Patient tested for COVID 19 prior to admission: YES  Bariatric?: No    Vital Signs:   Vitals:    01/03/21 2345 01/04/21 0000 01/04/21 0015 01/04/21 0020   BP: 123/74 120/61 119/70 114/71   Pulse: 85 73 73 78   Resp: 13 24 26 14   Temp:       TempSrc:       SpO2: 96% 95% 100% 100%   Weight:           Cardiac Rhythm:     Was the PSS-3 completed:   Yes  What interventions are required if any?               Family Comments: none here  OBS brochure/video discussed/provided to patient/family: Yes              Name of person given brochure if not patient: pt               Relationship to patient: pt    For the majority of the shift this patient's behavior was Green.   Behavioral interventions performed were none.    ED NURSE PHONE NUMBER: [phone]

## 2021-01-04 NOTE — ANESTHESIA POSTPROCEDURE EVALUATION
Patient: Mario Alberto Mcgarry    Procedure(s):  Open reduction, internal fixation and intramedullary michelle left tibia and fibula open reduction, internal fixation    Diagnosis:Tibia/fibula fracture [S82.209A, S82.409A]  Diagnosis Additional Information: No value filed.    Anesthesia Type:  General    Note:  Anesthesia Post Evaluation    Patient location during evaluation: PACU  Patient participation: Able to fully participate in evaluation  Level of consciousness: awake  Pain management: adequate  Airway patency: patent  Cardiovascular status: acceptable  Respiratory status: acceptable  Hydration status: acceptable  PONV: controlled     Anesthetic complications: None          Last vitals:  Vitals:    01/04/21 1652 01/04/21 1700 01/04/21 1710   BP:  129/81 126/77   Pulse:  87 75   Resp: 15 16 14   Temp:      SpO2: 96% 93% 95%         Electronically Signed By: Rachell Giarldo MD  January 4, 2021  5:33 PM

## 2021-01-04 NOTE — PLAN OF CARE
VSS on RA. Tmax 100.1, afebrile this a.m.. C/O left ankle pain 9/10 which is decreased to 5/10 with IV Dilaudid, Tylenol and IV Toradol. LLE elevated. CMS intact. Unable to palpate pulses 2/2 RJ. Voiding per urinal. NPO since admission. IV NS at 75cc/h.Very pleasant and appreciative.

## 2021-01-04 NOTE — PROGRESS NOTES
RECEIVING UNIT ED HANDOFF REVIEW    ED Nurse Handoff Report was reviewed by: Zara Mistry RN on January 4, 2021 at 1:20 AM

## 2021-01-04 NOTE — ED NOTES
Bed: ED11  Expected date:   Expected time:   Means of arrival:   Comments:  522 25M L ankle pain, possible fx

## 2021-01-04 NOTE — PROGRESS NOTES
Ortho Brief - full consult to follow    26 yo M with left tib/fib fx after fall snowboarding    Plan:  NPO  Elevate leg foam ramp  Surgery today

## 2021-01-04 NOTE — ED TRIAGE NOTES
Pt was snowboarding at [facility] when he went over a jump, both feet were still strapped in board when landed. Pt unable to stand on left ankle/leg

## 2021-01-04 NOTE — ANESTHESIA PROCEDURE NOTES
Airway   Date/Time: 1/4/2021 11:56 AM        Staff -   Anesthesiologist:  Daniel Augustin MD  CRNA: Zoie Ray APRN CRNA  Performed By: CRNA    Indications and Patient Condition  Indications for airway management: radha-procedural  Induction type:intravenousMask difficulty assessment: 1 - vent by mask    Final Airway Details  Final airway type: endotracheal airway  Successful airway:ETT - single  Endotracheal Airway Details   ETT size (mm): 7.0  Successful intubation technique: direct laryngoscopy  Grade View of Cords: 2  Measured from: gums/teeth  Secured at (cm): 21  Secured with: pink tape  Bite block used: Oral Airway    Post intubation assessment   Placement verified by: capnometry, equal breath sounds and chest rise   Number of attempts at approach: 1  Number of other approaches attempted: 0  Secured with:pink tape  Ease of procedure: easy  Dentition: Intact

## 2021-01-04 NOTE — PROGRESS NOTES
Brief Hospitalist Note - No charge     Pt admitted early this morning by Dr. Chino with mechanical fall causing acute left tibial/fibular fracture. Pt has no underlying medical co-morbidities and has been cleared for surgery this afternoon. Appreciate orthopedics assistance.     Pt is doing well this morning. Pain is well controlled. Examination is unremarkable.     - No changes to current plan.   - dispo per ortho    Claire Ortiz MD

## 2021-01-04 NOTE — H&P
[facility]    History and Physical  Hospitalist       Date of Admission:  1/3/2021  Date of Service (when I saw the patient): 01/04/21    ASSESSMENT  Mario Alberto Mcgarry is a markedly pleasant 25 year old gentleman who presents with mechanical fall causing acute left tibial/fibular fracture.    PLAN    Mechanical fall causing acute left tibial/fibular fracture: Immobilized under conscious sedation in the ED, with tentative reported plans for operative intervention around noon today.    -- Observation. NPO. Orthopedics consulted. NWB LLE. Tylenol, Oxycodone, IV Dilaudid and IV Toradol as needed for pain. Anti-emetics as needed. 75 ml/hour NS ordered.     -- Monitor for signs of alcohol withdrawal (none seen currently and overall this might seem unlikely to develop); start CIWA if they occur    Gail-operative Risk Assessment: RCRI 0 with Functional Status greater than 4 METS going for non-high risk procedure for CV events. He is 25 years old. Recommend to proceed to urgent surgery without further cardiac intervention or testing.    Rule Out COVID-19 infection  This patient was evaluated during a global COVID-19 pandemic, which necessitated consideration that the patient might be at risk for infection with the SARS-CoV-2 virus that causes COVID-19. Applicable protocols for evaluation were followed during the patient's care. LOW suspicion for infection.   -follow-up COVID-19 PCR test result; no current indication for precautions     Chief Complaint   Left ankle pain    History is obtained from the patient and the ED physician whom I have spoken with    History of Present Illness   Mario Alberto Mcgarry is a markedly pleasant 25 year old gentleman who presents with sudden onset sharp, severe pain in the left ankle and lower leg that has been constant since he fell on it this evening; he was snowboarding when he landed awkwardly on the left ankle. The pain is exacerbated by attempts at weight bearing  "and only partially relieved with rest. He says he had 4 beers prior to snowboarding. He says he drinks about once a month. He vapes, without use of THC. He denies any other drug use. He denies any prior surgeries or any current or past medical problems. He denies any current or recent cough, cold, runny nose, sore throat, diarrhea, dyspnea, sick contacts, or any other acute complaints.    In the ED, Blood pressure 114/71, pulse 78, temperature 98.7  F (37.1  C), temperature source Oral, resp. rate 14, weight 74.8 kg (165 lb), SpO2 100 %.    CBC and BMP were notable for WBC 12.6, otherwise were within the normal reference range.    Initial x-ray of the left ankle showed: \"IMPRESSION: Oblique fracture distal tibia with displacement of 10 mm. There is also a comminuted, displaced distal fibular fracture extending to the ankle joint. Ankle mortise remains symmetric. Surrounding soft tissue swelling.\"    Under conscious sedation a sling was placed. Follow up x-ray showed: \"IMPRESSION: Oblique spiral type mildly displaced fracture distal left tibial shaft with slight lateral displacement of the distal fracture fragment. Alignment is improved compared with the previous study. Oblique comminuted mildly displaced fracture   distal left fibular shaft. Ankle mortise is intact. Overlapping cast material obscures some detail.\"    The case was discussed with Orthopedics in the ED. He received Dilaudid, with partial relief of the pain.    PHYSICAL EXAM  Blood pressure 114/71, pulse 78, temperature 98.7  F (37.1  C), temperature source Oral, resp. rate 14, weight 74.8 kg (165 lb), SpO2 100 %.  Constitutional: Alert and oriented to person, place and time; no apparent distress  HEENT: normocephalic moist mucus membranes  Respiratory: lungs clear to auscultation bilaterally  Cardiovascular: regular S1 S2   GI: abdomen soft non tender non distended bowel sounds positive  Lymph/Hematologic: no pallor, no cervical lymphadenopathy  Skin: " no rash, good turgor  Musculoskeletal: LLE immobilized; no edema in RLE  Neurologic: extra-ocular muscles intact; moves all four extremities  Psychiatric: appropriate affect, insight and judgment     DVT Prophylaxis: Pneumatic Compression Devices alone, pending upcoming operative intervention  Code Status: Full Code    Disposition: Expected discharge in 0-2 days    Vahid Chino MD    Past Medical History    I have reviewed this patient's medical history and updated it with pertinent information if needed.     1) Prior right 5th finger fracture    Past Surgical History   I have reviewed this patient's surgical history and updated it with pertinent information if needed.    No prior surgeries    Prior to Admission Medications   Prior to Admission Medications   Prescriptions Last Dose Informant Patient Reported? Taking?   NO ACTIVE MEDICATIONS   Yes No   ORDER FOR DME   No No      Facility-Administered Medications: None     Allergies   No Known Allergies    Social History   I have reviewed this patient's social history and updated it with pertinent information if needed. Mario Alberto Mcgarry  reports that he has never smoked. He does not have any smokeless tobacco history on file. He reports current alcohol use.    Family History   Family history assessed and, except as above, is non-contributory.    Review of Systems   The 10 point Review of Systems is negative other than noted in the HPI or here.     Primary Care Physician   Physician No Ref-Primary    Data   Labs Ordered and Resulted from Time of ED Arrival Up to the Time of Departure from the ED   CBC WITH PLATELETS DIFFERENTIAL - Abnormal; Notable for the following components:       Result Value    WBC 12.6 (*)     RBC Count 4.39 (*)     Absolute Neutrophil 10.5 (*)     All other components within normal limits   SARS-COV-2 (COVID-19) VIRUS RT-PCR   BASIC METABOLIC PANEL   CARDIAC CONTINUOUS MONITORING   PATIENT CARE ORDER       Data reviewed today:  I  personally reviewed no images or EKG's today.    Recent Results (from the past 24 hour(s))   XR Ankle Left 3 Views    Narrative    EXAM: XR ANKLE LT G/E 3 VW  LOCATION: [facility]  DATE/TIME: 1/3/2021 9:40 PM    INDICATION: Unstable left ankle. Pain.  COMPARISON: None.      Impression    IMPRESSION: Oblique fracture distal tibia with displacement of 10 mm. There is also a comminuted, displaced distal fibular fracture extending to the ankle joint. Ankle mortise remains symmetric. Surrounding soft tissue swelling.

## 2021-01-04 NOTE — ANESTHESIA CARE TRANSFER NOTE
Patient: Mario Alberto Mcgarry    Procedure(s):  Open reduction, internal fixation and intramedullary michelle left tibia and fibula open reduction, internal fixation    Diagnosis: Tibia/fibula fracture [S82.209A, S82.409A]  Diagnosis Additional Information: No value filed.    Anesthesia Type:   General     Note:  Airway :Face Mask  Patient transferred to:PACU  Comments: Neuromuscular blockade reversed after TOF 4/4, spontaneous respirations, adequate tidal volumes, followed commands to voice, oropharynx suctioned with soft flexible catheter, extubated atraumatically, extubated with suction, airway patent after extubation.  Oxygen via facemask at 4 liters per minute to PACU. After extubation, patient remained in OR for 14 minutes until transport to PACU due to standard hospital COVID-19 precautions, Oxygen tubing connected to wall O2 in PACU, SpO2, NiBP, and EKG monitors and alarms on and functioning, Laura Hugger warmer connected to patient gown.   Handoff Report: Identifed the Patient, Identified the Reponsible Provider, Reviewed the pertinent medical history, Discussed the surgical course, Reviewed Intra-OP anesthesia mangement and issues during anesthesia, Set expectations for post-procedure period and Allowed opportunity for questions and acknowledgement of understanding      Vitals: (Last set prior to Anesthesia Care Transfer)    CRNA VITALS  1/4/2021 1527 - 1/4/2021 1605      1/4/2021             Resp Rate (observed):  (!) 4                Electronically Signed By: LINA Arriola CRNA  January 4, 2021  4:05 PM

## 2021-01-05 ENCOUNTER — APPOINTMENT (OUTPATIENT)
Dept: PHYSICAL THERAPY | Facility: CLINIC | Age: 26
End: 2021-01-05
Attending: ORTHOPAEDIC SURGERY
Payer: MEDICAID

## 2021-01-05 VITALS
RESPIRATION RATE: 12 BRPM | TEMPERATURE: 98 F | OXYGEN SATURATION: 97 % | SYSTOLIC BLOOD PRESSURE: 124 MMHG | BODY MASS INDEX: 25.94 KG/M2 | DIASTOLIC BLOOD PRESSURE: 72 MMHG | HEART RATE: 70 BPM | HEIGHT: 70 IN | WEIGHT: 181.2 LBS

## 2021-01-05 LAB
GLUCOSE SERPL-MCNC: 126 MG/DL (ref 70–99)
HGB BLD-MCNC: 11.2 G/DL (ref 13.3–17.7)

## 2021-01-05 PROCEDURE — 85018 HEMOGLOBIN: CPT | Performed by: ORTHOPAEDIC SURGERY

## 2021-01-05 PROCEDURE — G0378 HOSPITAL OBSERVATION PER HR: HCPCS

## 2021-01-05 PROCEDURE — 99207 PR CDG-CODE CATEGORY CHANGED: CPT | Performed by: STUDENT IN AN ORGANIZED HEALTH CARE EDUCATION/TRAINING PROGRAM

## 2021-01-05 PROCEDURE — 97116 GAIT TRAINING THERAPY: CPT | Mod: GP,59 | Performed by: PHYSICAL THERAPIST

## 2021-01-05 PROCEDURE — 99217 PR OBSERVATION CARE DISCHARGE: CPT | Performed by: STUDENT IN AN ORGANIZED HEALTH CARE EDUCATION/TRAINING PROGRAM

## 2021-01-05 PROCEDURE — 96376 TX/PRO/DX INJ SAME DRUG ADON: CPT

## 2021-01-05 PROCEDURE — 97161 PT EVAL LOW COMPLEX 20 MIN: CPT | Mod: GP | Performed by: PHYSICAL THERAPIST

## 2021-01-05 PROCEDURE — 97530 THERAPEUTIC ACTIVITIES: CPT | Mod: GP | Performed by: PHYSICAL THERAPIST

## 2021-01-05 PROCEDURE — 250N000013 HC RX MED GY IP 250 OP 250 PS 637: Performed by: ORTHOPAEDIC SURGERY

## 2021-01-05 PROCEDURE — 82947 ASSAY GLUCOSE BLOOD QUANT: CPT | Performed by: ORTHOPAEDIC SURGERY

## 2021-01-05 PROCEDURE — 36415 COLL VENOUS BLD VENIPUNCTURE: CPT | Performed by: ORTHOPAEDIC SURGERY

## 2021-01-05 PROCEDURE — 250N000011 HC RX IP 250 OP 636: Performed by: ORTHOPAEDIC SURGERY

## 2021-01-05 RX ORDER — AMOXICILLIN 250 MG
1 CAPSULE ORAL 2 TIMES DAILY PRN
Qty: 20 TABLET | Refills: 0 | Status: SHIPPED | OUTPATIENT
Start: 2021-01-05

## 2021-01-05 RX ORDER — ONDANSETRON 4 MG/1
4 TABLET, ORALLY DISINTEGRATING ORAL EVERY 6 HOURS PRN
Qty: 8 TABLET | Refills: 0 | Status: SHIPPED | OUTPATIENT
Start: 2021-01-05

## 2021-01-05 RX ORDER — HYDROXYZINE HYDROCHLORIDE 25 MG/1
25 TABLET, FILM COATED ORAL EVERY 6 HOURS PRN
Qty: 16 TABLET | Refills: 0 | Status: SHIPPED | OUTPATIENT
Start: 2021-01-05

## 2021-01-05 RX ORDER — OXYCODONE HYDROCHLORIDE 5 MG/1
5 TABLET ORAL EVERY 4 HOURS PRN
Qty: 30 TABLET | Refills: 0 | Status: SHIPPED | OUTPATIENT
Start: 2021-01-05

## 2021-01-05 RX ORDER — ACETAMINOPHEN 325 MG/1
650 TABLET ORAL EVERY 4 HOURS PRN
Qty: 30 TABLET | Refills: 0 | Status: SHIPPED | OUTPATIENT
Start: 2021-01-05

## 2021-01-05 RX ADMIN — POLYETHYLENE GLYCOL 3350 17 G: 17 POWDER, FOR SOLUTION ORAL at 08:19

## 2021-01-05 RX ADMIN — CEFAZOLIN 1 G: 1 INJECTION, POWDER, FOR SOLUTION INTRAMUSCULAR; INTRAVENOUS at 06:03

## 2021-01-05 RX ADMIN — KETOROLAC TROMETHAMINE 15 MG: 15 INJECTION, SOLUTION INTRAMUSCULAR; INTRAVENOUS at 03:14

## 2021-01-05 RX ADMIN — OXYCODONE HYDROCHLORIDE 5 MG: 5 TABLET ORAL at 08:48

## 2021-01-05 RX ADMIN — DOCUSATE SODIUM 50 MG AND SENNOSIDES 8.6 MG 1 TABLET: 8.6; 5 TABLET, FILM COATED ORAL at 08:19

## 2021-01-05 RX ADMIN — DOCUSATE SODIUM 100 MG: 100 CAPSULE, LIQUID FILLED ORAL at 08:19

## 2021-01-05 RX ADMIN — KETOROLAC TROMETHAMINE 15 MG: 15 INJECTION, SOLUTION INTRAMUSCULAR; INTRAVENOUS at 08:18

## 2021-01-05 ASSESSMENT — MIFFLIN-ST. JEOR: SCORE: 1818.17

## 2021-01-05 NOTE — PLAN OF CARE
Patient up with SBA and crutches. Adequate I/O. Pain managed with PO meds. Discharge AVS and medications reviewed with patient and patients mother on phone, all questions answered at this time. Patient discharged home with belongings.

## 2021-01-05 NOTE — BRIEF OP NOTE
[facility]    Brief Operative Note    Pre-operative diagnosis: Tibia/fibula fracture [S82.209A, S82.409A]  Post-operative diagnosis Same as pre-operative diagnosis    Procedure: Procedure(s):  Open reduction, internal fixation and intramedullary michelle left tibia and fibula open reduction, internal fixation  Surgeon: Surgeon(s) and Role:     * Earle Zhong MD - Primary  Anesthesia: General   Estimated blood loss: 300 ml  Drains: None  Specimens: * No specimens in log *  Findings:   None.  Complications: None.  Implants:   Implant Name Type Inv. Item Serial No.  Lot No. LRB No. Used Action   IMP SCR SYN CORTEX 3.5X45MM SELF TAP .845 Metallic Hardware/[place] IMP SCR SYN CORTEX 3.5X45MM SELF TAP .845  SYNTHES-STRATEC LOAD 42 02 03JAN 2021 Left 1 Implanted   IMP 9MM TI CANNULATED TIBIAL NAIL-EX/345MM, 04.004.349S    [employer] [id number] Left 1 Implanted   IMP PLATE SYN LCP 2.7X49MM 05H 249.681 Metallic Hardware/[place] IMP PLATE SYN LCP 2.7X49MM 05H 249.681  SY[employer]AT[employer] LOAD 42 04 03JAN 2021 Left 1 Implanted   IMP SCR SYN LCP DIST 2.7X10MM SELF TAP .210 Metallic Hardware/[place] IMP SCR SYN LCP DIST 2.7X10MM SELF TAP .210  SY[employer]ATEC LOAD 42 04 03JAN 2021 Left 2 Implanted   IMP 4.0MM TI LOCKING SCREW W/T25 STARDRIVE 36MM F/IM NAILS-STER, 04.005.426S    [employer] [id number] Left 1 Implanted   IMP 4.0MM TI LOCKING SCREW W/T25 STARDRIVE 34MM F/IM NAILS-STER, 04.005.424S    [employer] [id number] Left 1 Implanted   IMP 4.0MM TI LOCKING SCREW W/T25 STARDRIVE 32MM F/IM NAILS-STER, 04.005.422S    [employer] [id number] Left 1 Wasted   IMP 4.0MM TI LOCKING SCREW W/T25 STARDRIVE 38MM F/IM NAILS-STER, 04.005.428S    [employer] [id number] Left 1 Implanted   IMP 4.0MM TI LOCKING SCREW W/T25 STARDRIVE 46MM F/IM NAILS-STER, 04.005.436S    SYNTHES [id number] Left 1 Implanted   IMP SCR SYN LCP DIST 2.7X10MM SELF TAP .210 Metallic Hardware/[place] IMP SCR SYN LCP DIST 2.7X10MM SELF TAP SS  202.210  [employer] [id number] Left 2 Wasted   IMP SCR SYN CORTEX T8 STARDRIVE 2.7X30MM SELF .890 Metallic Hardware/[place] IMP SCR SYN CORTEX T8 STARDRIVE 2.7X30MM SELF .890  [employer] [id number] Left 1 Implanted   IMP SCR SYN CORTEX T8 STARDRIVE 2.7X20MM SELF .880 Metallic Hardware/[place] IMP SCR SYN CORTEX T8 STARDRIVE 2.7X20MM SELF .880  [employer] [id number] Left 1 Implanted   IMP SCR SYN CORTEX 3.5X14MM SELF TAP .814 Metallic Hardware/[place] IMP SCR SYN CORTEX 3.5X14MM SELF TAP .814  [employer] [id number] Left 4 Implanted   IMP SCR SYN LCP DIST 2.7X10MM SELF TAP .210 Metallic Hardware/[place] IMP SCR SYN LCP DIST 2.7X10MM SELF TAP .210  [employer] [id number] Left 1 Implanted   IMP SCR SYN LCP DIST 2.7X14MM SELF TAP .214 Metallic Hardware/[place] IMP SCR SYN LCP DIST 2.7X14MM SELF TAP .214  [employer] [id number] Left 3 Implanted   IMP SCR SYN LCP DIST 2.7X16MM SELF TAP .216 Metallic Hardware/[place] IMP SCR SYN LCP DIST 2.7X16MM SELF TAP .216  [employer] [id number] Left 1 Implanted   IMP PLATE SYN LCP LAT DST FIB 2.7/3.5R946BE 9H LT 02.112.149 Metallic Hardware/[place] IMP PLATE SYN LCP LAT DST FIB 2.7/3.8D854NA 9H LT 02.112.149  [employer] [id number] Left 1 Implanted     Plan:  LLE -   Elevate  NWB LLE x 6 weeks  Vit D 2000 units daily  Lovenox 40 mg daily x 2 weeks then ASA 325mg qday x 6 wks  PT/OT  Ancef x 24  Follow up 2 weeks and 6 weeks  [facility]  [facility]    Monday 1-5 PM  and Thursday 7:30-12:00 AM\  [address] [place] [address]  [phone]    [phone]    Or    Tuesday 7:30-5 PM  [address] [facility] [address]  [place], [place]

## 2021-01-05 NOTE — PROGRESS NOTES
01/05/21 0945   Quick Adds   Type of Visit Initial PT Evaluation   Living Environment   People in home sibling(s)   Current Living Arrangements house   Home Accessibility stairs to enter home;stairs within home   Number of Stairs, Main Entrance 2   Stair Railings, Main Entrance none   Number of Stairs, Within Home, Primary 10   Stair Railings, Within Home, Primary railing on right side (ascending)   Transportation Anticipated car, drives self;family or friend will provide   Living Environment Comments Pt lives with his twin brother, brother's wife and child in a house wiht steps to enter and within. Pt's reports his brother's wife is home during the day and his mom lives locally and is not currently working so available toa ssist as well   Self-Care   Usual Activity Tolerance excellent   Current Activity Tolerance good   Regular Exercise Yes   Equipment Currently Used at Home none   Activity/Exercise/Self-Care Comment IND with all mobility and ADLs at baseline   Disability/Function   Walking or Climbing Stairs Difficulty no   Dressing/Bathing Difficulty no   Toileting issues no   Doing Errands Independently Difficulty (such as shopping) no   Fall history within last six months no   Change in Functional Status Since Onset of Current Illness/Injury yes   General Information   Onset of Illness/Injury or Date of Surgery 01/04/21   Referring Physician Earle Zhong MD   Patient/Family Therapy Goals Statement (PT) Home   Pertinent History of Current Problem (include personal factors and/or comorbidities that impact the POC) Pt is a 26yo male admitted following a fall when when he was snowboarding resulting in L ankle pain, found to have L displaced distal third tibia fx, comminuted L distal fibula fracture, posterior malleolus fracture; Pt now POD1 s/p ORIF L distal tibia, distal fibula, and posterior malleolus. Pt to be NWB x6 weeks per op note.   Existing Precautions/Restrictions fall   Weight-Bearing Status -  LLE nonweight-bearing   General Observations Pt resting in bed with LLE elevated on blue foam wedge   Cognition   Orientation Status (Cognition) oriented x 4   Affect/Mental Status (Cognition) WFL   Follows Commands (Cognition) WFL   Pain Assessment   Patient Currently in Pain   (5/10 L ankle)   Integumentary/Edema   Integumentary/Edema Comments long L leg splint    Posture    Posture Not impaired   Range of Motion (ROM)   ROM Comment BLE WFL except L ankle not tested   Strength   Manual Muscle Testing Quick Adds Able to perform L SLR;No deficits observed during functional mobility   Bed Mobility   Comment (Bed Mobility) IND supine<>sit   Transfers   Transfer Safety Comments SBA sit>Stand to FWW   Gait/Stairs (Locomotion)   Comment (Gait/Stairs) SBA 5' with FWW, NWB on L, steady    Balance   Balance Comments decreased balance d/t NWB on L   Clinical Impression   Criteria for Skilled Therapeutic Intervention yes, treatment indicated   PT Diagnosis (PT) Impaired gait   Influenced by the following impairments NWB LLE, pain, decreased balance   Functional limitations due to impairments Decreased safety and IND with functional transfers, gait, stairs   Clinical Presentation Stable/Uncomplicated   Clinical Decision Making (Complexity) low complexity   Therapy Frequency (PT) One time eval and treatment only   Planned Therapy Interventions (PT) gait training;home exercise program;patient/family education;stair training;strengthening;transfer training   Anticipated Equipment Needs at Discharge (PT) tub bench  (states his mom's friend is borrowing him one)   Risk & Benefits of therapy have been explained evaluation/treatment results reviewed;care plan/treatment goals reviewed;risks/benefits reviewed;current/potential barriers reviewed;participants voiced agreement with care plan;participants included;patient   PT Discharge Planning    PT Discharge Recommendation (DC Rec) home with assist   PT Rationale for DC Rec Pt moving  well on POD1, anticipate safe disch to home with Ax1 from family on stairs. Pt has all AD/AE needed at home and family available to assist as needed.   PT Brief overview of current status  Pt IND with bed mobility, Mod IND with transfers and ambulation with both FWW and crutches (has both at home too), needed [place] on stairs with crutches - states his brother can assist him. No further IP PT needs identified, orders completed.   Total Evaluation Time   Total Evaluation Time (Minutes) 8

## 2021-01-05 NOTE — PROGRESS NOTES
Care Management Follow Up    Length of Stay (days): 0    Expected Discharge Date: 01/05/21     Concerns to be Addressed:  insurance     Patient plan of care discussed at interdisciplinary rounds: Yes    Anticipated Discharge Disposition:  home     Anticipated Discharge Services:   Anticipated Discharge DME:      Patient/family educated on Medicare website which has current facility and service quality ratings:    Education Provided on the Discharge Plan:    Patient/Family in Agreement with the Plan:      Referrals Placed by CM/SW:    Private pay costs discussed: Not applicable    Additional Information:  Writer informed that patient has questions about insurance. Writer is informed that patient is between jobs and insurance has lapsed. Writer provided MNSure information and called financial counseling regarding assisting with MA application. Writer updated patient and he is in agreement.    Will continue to follow      SID Rosado

## 2021-01-05 NOTE — OP NOTE
Procedure Date: 01/04/2021      PREOPERATIVE DIAGNOSES:   1.  Left displaced distal third tibia fracture.   2.  Left comminuted distal fibula fracture.   3.  Left distal tibia, posterior malleolus fracture.   4.  Nicotine user.      POSTOPERATIVE DIAGNOSES:   1.  Left displaced distal third tibia fracture.   2.  Left comminuted distal fibula fracture.   3.  Left distal tibia, posterior malleolus fracture.   4.  Nicotine user.      PROCEDURES PERFORMED:   1.  Open reduction and internal fixation, left distal third tibia fracture, with intramedullary michelle and mini frag plate.   2.  Open reduction and internal fixation, left distal fibula.   3.  Open reduction and internal fixation, left posterior malleolus, tibia.   4.  Application of a long leg splint.      SURGEON:  Earle Zhong MD      ASSISTANT:  PHYLLIS [facility], whose assistance was critical for positioning this patient, retraction during fracture reduction, placement of implants, closure and splint application.      IMPLANTS USED:  Synthes tibial nail, 345 mm x 9 mm with 2 proximal interlocks and 2 distal interlocks.  Synthes 2.7 mm mini frag plate.  Synthes small frag screw for posterior malleolus fixation.  Synthes precontoured distal fibula locking plate.      INDICATIONS:  This 25-year-old male who works as a  and uses nicotine, who fell snowboarding, having the above-named injury.  I have recommended surgery.  The risks of his injury and surgery discussed included but not limited to bleeding, infection, damage to surrounding neurovascular structures, malunion, delayed union, need for additional surgeries including hardware removal, ankle stiffness, blood clots going to heart, lung or brain, anesthetic complications, even death.  He understands and wishes to proceed.  Consent signed.      DESCRIPTION OF PROCEDURE:  Patient identified in the preop holding area per hospital policy, correct operative site marked, to the OR and to  the OR table.  General anesthesia induced.  Chlorhexidine prescrub, ChloraPrep, drape.  Timeout performed, all in the room agreed.      First under fluoroscopy, percutaneously reduced the spiral distal third tibia fracture.  Then, with the leg in a semi-extended position, performed a lateral parapatellar arthrotomy, introducing a guidewire under fluoroscopy, followed by opening reamer.  Passed the guide michelle down into a center-center position at the ankle.  Visualized the posterior malleolus fracture.  Percutaneously applied a large pointed bone reduction clamp, reducing the posterior malleolus.  Then carefully clearing all soft tissues anteriorly and through a drill sleeve, drilled and placed a cortical screw for posterior malleolus fixation with good purchase.  Now reamed up sequentially starting at 8.5 with good chatter to 10.5.  The nail was then placed down; however, it was displacing the fracture.  The nail was backed out and attempted an open reduction of the fracture.  Passed the nail and it again was displacing the visualized reduction.  The nail was removed.  The fracture was again reduced and then used a 5-hole 2.7 mm mini frag plate to reduce and hold the fracture with unicortical screws.  Now again reamed up to 11 mm.  Placed, again, the nail.  This time the fracture was reduced nicely.  Placed 2 distal interlock screws.  The third interlock was blocked to the plate.  I attempted to remove the mini frag plate; however, the fracture then again reduced.  Using the mini frag plate and a cortical screw I was again able to reduce the fracture.  This showed a near anatomic reduction of the tibia.      The fibula was considerably shortened and rotated with a large long comminuted segment distally.  It was not in an acceptable position.  Made a lateral approach to the distal fibula, dissecting to the bone distally and more proximally, taking care to avoid the superficial peroneal nerve.  Left the soft tissue  intact around the comminuted zone.  Passed a precontoured Synthes distal locking fibular plate up.  Using a lobster, applied the appropriate rotation on the fibula and brought it out to length, placing a pin and provisionally pinning through the fibula into the talus to hold appropriate length and rotation.  Locking screws were placed distally with the plate pressed firmly against the bone.  Then, when appropriate length and rotation was restored, placed cortical screws proximally, all with excellent purchase.  The pins were removed.  Final imaging showed restoration of the ankle mortise, appropriate position of the fibula.  Tourniquet was used only on the fibular portion for approximately 30 minutes at 250 mmHg.  Irrigated all wounds with a dilute Betadine solution and then spread of a gram of vancomycin between the wounds.  Closed the arthrotomy with #1 Vicryl, 0 Vicryl, 2-0 Vicryl, staples.  The proximal incisions were closed with staples.  The distal incisions were closed in layered fashion, 0 Vicryl, 2-0 Vicryl and 3-0 nylon.  There was no undue tension.  The wounds were clean.  Placed Xeroform, well-padded posterior slab splint with the ankle in a neutral position.      POSTOPERATIVE PLAN:   1.  Nonweightbearing x 6 weeks.   2.  Elevate left ankle.   3.  Deep venous thrombosis prophylaxis with Lovenox 40 mg subcutaneous daily x 2 weeks, followed by aspirin enteric-coated 325 mg daily for an additional 6 weeks.   4.  Vitamin D 2000 units daily.   5.  Stop smoking and using nicotine.   6.  1-2 week followup for wound check and transition to a Cam boot.     7.  2-3 week followup for wound check and removal of sutures and staples.   8.  Ancef x 24 hours.         ORVILLE SUBRAMANIAN MD             D: 2021   T: 2021   MT: BIPIN      Name:     LUISA JENSEN   MRN:      [id number]        Account:        [id number]   :      1995           Procedure Date: 2021      Document: [id number]

## 2021-01-05 NOTE — PROGRESS NOTES
Patient vital signs are at baseline: Yes  Patient able to ambulate as they were prior to admission or with assist devices provided by therapies during their stay:  No, PT tomorrow, dangled  Patient MUST void prior to discharge:  Yes, urinal  Patient able to tolerate oral intake:  Yes  Pain has adequate pain control using Oral analgesics: No, IV dilaudid and toradol

## 2021-01-05 NOTE — PLAN OF CARE
Patient vital signs are at baseline: Yes  Patient able to ambulate as they were prior to admission or with assist devices provided by therapies during their stay:  No,  Reason:  Has not gotten OOB, PT today  Patient MUST void prior to discharge:  Yes  Patient able to tolerate oral intake:  Yes  Pain has adequate pain control using Oral analgesics:  No,  Reason:  IV toradol    A/Ox4. VSS on ra. CMS intact. C/o minimal pain this shift, managed with scheduled toradol. Will cont to monitor

## 2021-01-05 NOTE — DISCHARGE SUMMARY
[facility]  Hospitalist Discharge Summary      Date of Admission:  1/3/2021  Date of Discharge:  1/5/2021  Discharging Provider: Claire Ortiz MD      Discharge Diagnoses   Mechanical fall causing acute left tibial/fibular fracture s/p ORIF on 1/4   Acute blood loss anemia 2/2 above     Follow-ups Needed After Discharge   Follow-up Appointments     Follow-up and recommended labs and tests      Follow up with Dr. Zhong at [facility] 2 weeks after surgery.  Call [phone] for appointment and questions.  Main [facility] number   [phone].           Discharge Disposition   Discharged to home  Condition at discharge: Stable    Hospital Course   Pt is a healthy 26 yo male who presented to the ED after falling while snow boarding. He had sudden onset sharp, severe pain in his left lower leg and was unable to bear weight. In the ED he was hemodynamically stable, labs were wnl, on imaging he was found to have oblique fracture of the distal tibia with displacement of 10mm. There was also a comminuted, displaced distal fibular fracture extending to the ankle joint. Orthopedics was consulted, and he underwent ORIF on 1/4/2021. There were no surgical complications. He was evaluated by PT prior to discharge and is safe to discharge home with PRN assistance of family.   - NWB LLE x6 weeks   - Crutches and rolling knee walker ordered by ortho   - Lovenox 40 mg daily x 2 weeks then ASA 325mg qday x 4 wks  - Acetaminophen, ibuprofen and oxycodone PRN for pain control   - Follow up with Dr. Zhong in [place] [facility] in 2 and 6 weeks.     Consultations This Hospital Stay   ORTHOPEDIC SURGERY IP CONSULT  PHYSICAL THERAPY ADULT IP CONSULT  OCCUPATIONAL THERAPY ADULT IP CONSULT    Code Status   Full Code    Time Spent on this Encounter   I, Claire Ortiz MD, personally saw the patient today and spent greater than 30 minutes discharging this patient.       Claire Ortiz MD  [facility]  [place] [address] [facility]TH[facility]  [address] [place] [address]  Phone: [phone]  ______________________________________________________________________    Physical Exam   Vital Signs: Temp: 98.4  F (36.9  C) Temp src: Oral BP: 125/68 Pulse: 63   Resp: 16 SpO2: 97 % O2 Device: Nasal cannula Oxygen Delivery: 2 LPM  Weight: 181 lbs 3.2 oz  General Appearance: Awake, alert, NAD   Respiratory: No increased work of breathing, CTAB  Cardiovascular: RRR, no murmurs   Skin: no rashes or lesions   MSK: Left leg in cast, able to wiggle toes        Primary Care Physician   Physician No Ref-Primary    Discharge Orders      Reason for your hospital stay    Left tibia/fibula fracture: ORIF     Follow-up and recommended labs and tests    Follow up with Dr. Zhong at [facility] 2 weeks after surgery.  Call [phone] for appointment and questions.  Main [facility] number [phone].     Activity    Your activity upon discharge: No weight bearing Left LE with use of crutches or walker     Wound care and dressings    Instructions to care for your wound at home: Keep splint intact, clean and dry     Discharge Instructions    1. Apply ice over splint for 20 minutes every two hours as needed to decrease pain and swelling.   2. Keep elevated at all times when at rest above your heart. This helps control swelling.   3. Keep splint clean and dry until follow-up visit. Do not try to remove the splint. May cover with plastic bag to shower. Call our clinic immediately if the splint gets wet to have it changed and avoid skin damage. DO NOT insert anything in your splint to scratch or for any other reason.   4. Transition from your narcotic pain control to just tylenol as you are able. Limit total acetaminophen to less than 3grams or 3000mg per day. No driving on narcotic pain medicine like oxycodone.   5. Take stool softener to avoid constipation while taking narcotics, and decrease or stop taking stool softeners if you  develop diarrhea   6. Notify care team if persistent nausea, vomiting, or fevers >101.5.     Rolling Knee Walker DME    DME Documentation: Describe the reason for need to support medical necessity: Impaired gait due to Foot/Ankle surgery. Anticipated length of need: 3 months     Walker DME    DME Documentation: Describe the reason for need to support medical necessity: Impaired gait due to Foot/Ankle surgery. Anticipated length of need: 3 months     Diet    Follow this diet upon discharge: Orders Placed This Encounter      Advance Diet as Tolerated: Regular Diet Adult         Significant Results and Procedures   Most Recent 3 CBC's:  Recent Labs   Lab Test 01/05/21  0752 01/03/21  2354   WBC  --  12.6*   HGB 11.2* 13.6   MCV  --  93   PLT  --  294     Most Recent 3 BMP's:  Recent Labs   Lab Test 01/05/21  0752 01/04/21  1853 01/03/21  2354   NA  --   --  137   POTASSIUM  --   --  3.6   CHLORIDE  --   --  108   CO2  --   --  25   BUN  --   --  13   CR  --  0.90 0.98   ANIONGAP  --   --  4   OLGA  --   --  8.7   *  --  90   ,   Results for orders placed or performed during the hospital encounter of 01/03/21   XR Ankle Left 3 Views    Narrative    EXAM: XR ANKLE LT G/E 3 VW  LOCATION: [facility]  DATE/TIME: 1/3/2021 9:40 PM    INDICATION: Unstable left ankle. Pain.  COMPARISON: None.      Impression    IMPRESSION: Oblique fracture distal tibia with displacement of 10 mm. There is also a comminuted, displaced distal fibular fracture extending to the ankle joint. Ankle mortise remains symmetric. Surrounding soft tissue swelling.   XR Ankle Port Left 2 Views    Narrative    EXAM: XR ANKLE PORT LT 2 VW  LOCATION: [facility]  DATE/TIME: 1/4/2021 12:26 AM    INDICATION: Post reduction.  COMPARISON: 1/3/2021.      Impression    IMPRESSION: Oblique spiral type mildly displaced fracture distal left tibial shaft with slight lateral displacement of the distal fracture fragment. Alignment is  improved compared with the previous study. Oblique comminuted mildly displaced fracture   distal left fibular shaft. Ankle mortise is intact. Overlapping cast material obscures some detail.   XR Surgery ALEX L/T 5 Min Fluoro w Stills    Narrative    XR SURGERY C-ARM FLUOROSCOPY LESS THAN FIVE MINUTES WITH STILLS   1/4/2021 3:22 PM     HISTORY: Open reduction internal fixation (ORIF) left ankle    COMPARISON: None      Impression    IMPRESSION: A total of 18 spot fluoroscopic images obtained throughout  the procedure with partial visualization of an intramedullary michelle in  the tibia. Surgical plate and screws in the distal tibia. Surgical  plate and screws throughout the distal fibula, as well. Fractures  through the distal tibia and fibula partially visible. Total  fluoroscopic time was 3.6 minutes.    GENE SULLIVAN MD   XR Tibia & Fibula Port Left 2 Views    Narrative    Examination:  XR TIBIA & FIBULA PORT LT 2 VW    Date:  1/4/2021 5:07 PM     Clinical Information: Postoperative evaluation.     Comparison: 1/4/2020.      Impression    Impression:    1.  Extensive ORIF of the tibia and fibula. Long intramedullary nail  within the tibia with proximal and distal interlocking screws. Medial  plate across the distal tibial metadiaphysis. Additional metaphyseal  and epiphyseal screws present. No evidence of immediate hardware  complication. Fractures have improved position and alignment.     2.  Lateral plate across the mid and distal fibula with multiple  fixation screws. No evidence of hardware loosening or complication.    3.  The left knee and ankle joints remain normally aligned.    DOROTHEA GODINEZ MD       Discharge Medications   Current Discharge Medication List      START taking these medications    Details   acetaminophen (TYLENOL) 325 MG tablet Take 2 tablets (650 mg) by mouth every 4 hours as needed for mild pain  Qty: 30 tablet, Refills: 0    Associated Diagnoses: Closed fracture of left ankle,  initial encounter      aspirin (ASA) 325 MG EC tablet Take 1 tablet (325 mg) by mouth daily for 28 days  Qty: 28 tablet, Refills: 0    Comments: Start daily ASA after completing 2 weeks of Lovenox for DVT prophylaxis.  Associated Diagnoses: Closed fracture of left ankle, initial encounter      enoxaparin ANTICOAGULANT (LOVENOX) 40 MG/0.4ML syringe Inject 0.4 mLs (40 mg) Subcutaneous every 24 hours for 14 days  Qty: 5.6 mL, Refills: 0    Associated Diagnoses: Closed fracture of left ankle, initial encounter      hydrOXYzine (ATARAX) 25 MG tablet Take 1 tablet (25 mg) by mouth every 6 hours as needed for other (adjuvant pain)  Qty: 16 tablet, Refills: 0    Associated Diagnoses: Closed fracture of left ankle, initial encounter      ondansetron (ZOFRAN-ODT) 4 MG ODT tab Take 1 tablet (4 mg) by mouth every 6 hours as needed for nausea or vomiting  Qty: 8 tablet, Refills: 0    Associated Diagnoses: Closed fracture of left ankle, initial encounter      oxyCODONE (ROXICODONE) 5 MG tablet Take 1 tablet (5 mg) by mouth every 4 hours as needed for moderate to severe pain  Qty: 30 tablet, Refills: 0    Associated Diagnoses: Closed fracture of left ankle, initial encounter      senna-docusate (SENOKOT-S/PERICOLACE) 8.6-50 MG tablet Take 1 tablet by mouth 2 times daily as needed for constipation  Qty: 20 tablet, Refills: 0    Associated Diagnoses: Closed fracture of left ankle, initial encounter         CONTINUE these medications which have NOT CHANGED    Details   NO ACTIVE MEDICATIONS       ORDER FOR DME     Associated Diagnoses: Wrist injury           Allergies   No Known Allergies

## 2021-01-05 NOTE — PROGRESS NOTES
Orthopedic Surgery  Mario Alberto Mcgarry  2021  Admit Date:  1/3/2021  POD: 1 Day Post-Op   Procedure(s):  Open reduction, internal fixation and intramedullary michelle left tibia and fibula open reduction, internal fixation    Alert and oriented to person, place, and time.  Patient resting comfortably in bed.    Pain controlled.  Tolerating oral intake.    Denies nausea or vomiting  Denies chest pain or shortness of breath    Vital Sign Ranges  Temperature Temp  Av.4  F (36.9  C)  Min: 97.5  F (36.4  C)  Max: 99.3  F (37.4  C)   Blood pressure Systolic (24hrs), Av , Min:123 , Max:144        Diastolic (24hrs), Av, Min:66, Max:88      Pulse Pulse  Av.1  Min: 54  Max: 101   Respirations Resp  Av.6  Min: 11  Max: 19   Pulse oximetry SpO2  Av.5 %  Min: 93 %  Max: 100 %       Dressing is clean, dry, and intact.   Minimal erythema of the surrounding skin.   Proximal left calf is soft, non-tender.  Left lower extremity is NVI.  Sensation intact bilateral lower extremities  Able to flex/extend toes  +Dp pulse    Labs:  Recent Labs   Lab Test 21  2354   POTASSIUM 3.6     Recent Labs   Lab Test 21  0752 21  2354   HGB 11.2* 13.6     No results for input(s): INR in the last 21990 hours.  Recent Labs   Lab Test 21  2354          1. PLAN:   Continue Lovenox x 2 weeks then ASA x 4 weeks for DVT prophylaxis.     Mobilize with PT/OT    Non-WB Left LE.     Continue current pain regiment.   Dressings: Keep intact.     2. Disposition   Anticipate d/c to home today when medically cleared and progressing in PT.  Ok from ortho standpoint.     Marianne Harris PA-C

## 2021-01-05 NOTE — DISCHARGE INSTRUCTIONS
Here is a video link for you and your family to learn or review instructions on self Lovenox injection    Insert the link below into your browser to view video      [email or web address]

## 2021-01-05 NOTE — PLAN OF CARE
Physical Therapy Discharge Summary    Reason for therapy discharge:    All goals and outcomes met, no further needs identified.    Progress towards therapy goal(s). See goals on Care Plan in Epic electronic health record for goal details.  Goals met    Therapy recommendation(s):    No further therapy recommended at this time, defer to surgeon for potential OP PT pending recovery. Recommend pt have Ax1 on stairs and continue with elevation on blue foam wedge.

## 2021-01-13 NOTE — PROGRESS NOTES
[facility]      OUTPATIENT PHYSICAL THERAPY EVALUATION  PLAN OF TREATMENT FOR OUTPATIENT REHABILITATION  (COMPLETE FOR INITIAL CLAIMS ONLY)  Patient's Last Name, First Name, M.I.  YOB: 1995  Mario Alberto Mcgarry  HALLE                        Provider's Name  [facility] Medical Record No.  [id number]                               Onset Date:  01/04/21   Start of Care Date:  01/05/21      Type:     _X_PT   ___OT   ___SLP Medical Diagnosis:  s/p ORIF L distal tibia, distal fibula, and posterior malleolus                        PT Diagnosis:  Impaired gait   Visits from SOC:  1   _________________________________________________________________________________  Plan of Treatment/Functional Goals    Planned Interventions: gait training, home exercise program, patient/family education, stair training, strengthening, transfer training     Goals: See Physical Therapy Goals on Care Plan in [employer] electronic health record.    Therapy Frequency: One time eval and treatment only  Predicted Duration of Therapy Intervention:    _________________________________________________________________________________    I CERTIFY THE NEED FOR THESE SERVICES FURNISHED UNDER        THIS PLAN OF TREATMENT AND WHILE UNDER MY CARE     (Physician co-signature of this document indicates review and certification of the therapy plan).                Certification date from: 01/05/21, Certification date to: 01/05/21    Referring Physician: Earle Zhong MD            Initial Assessment        See Physical Therapy evaluation dated 01/05/21 in Epic electronic health record.

## 2021-01-15 NOTE — ADDENDUM NOTE
Addendum  created 01/15/21 1130 by Daniel Augustin MD    Attestation recorded in Intraprocedure, Intraprocedure Attestations filed

## (undated) DEVICE — CAST PADDING 4" UNSTERILE 9044

## (undated) DEVICE — WIRE GUIDE 3.2X400MM  357.399

## (undated) DEVICE — SU VICRYL 2-0 CP-1 27" UND J266H

## (undated) DEVICE — GLOVE PROTEXIS POWDER FREE 7.5 ORTHOPEDIC 2D73ET75

## (undated) DEVICE — SOLUTION WOUND CLEANSING 3/4OZ 10% PVP EA-L3011FB-50

## (undated) DEVICE — DRSG XEROFORM 5X9" 8884431605

## (undated) DEVICE — DRAPE C-ARMOR 5 SIDED 5523

## (undated) DEVICE — DRILL BIT 3.2MM QC/330/100MM

## (undated) DEVICE — SPONGE LAP 18X18" X8435

## (undated) DEVICE — DRILL BIT 3.2MM QC/NEEDLE POIN

## (undated) DEVICE — GLOVE PROTEXIS BLUE W/NEU-THERA 8.0  2D73EB80

## (undated) DEVICE — DRAPE C-ARM 60X42" 1013

## (undated) DEVICE — DRAPE SHEET REV FOLD 3/4 9349

## (undated) DEVICE — GLOVE PROTEXIS BLUE W/NEU-THERA 7.5  2D73EB75

## (undated) DEVICE — PACK TOTAL KNEE SOP15TKFSD

## (undated) DEVICE — BNDG ELASTIC 4" DBL LENGTH UNSTERILE 6611-14

## (undated) DEVICE — SU VICRYL 0 CT-1 36" J346H

## (undated) DEVICE — DRILL BIT SYN QUICK COUPLING 2.0X140MM  323.062

## (undated) DEVICE — DRAPE IOBAN INCISE 23X17" 6650EZ

## (undated) DEVICE — IMM LIMB ELEVATOR DC40-0203

## (undated) DEVICE — PREP CHLORAPREP 26ML TINTED ORANGE  260815

## (undated) DEVICE — DRSG ABDOMINAL 07 1/2X8" 7197D

## (undated) DEVICE — ESU GROUND PAD ADULT W/CORD E7507

## (undated) DEVICE — SU VICRYL 2-0 CT-1 27" UND J259H

## (undated) DEVICE — DRILL BIT QUICK COUPLING 2.5X110MM GOLD 310.25

## (undated) DEVICE — GLOVE PROTEXIS POWDER FREE 8.0 ORTHOPEDIC 2D73ET80

## (undated) DEVICE — ROD SYN REAMER BALL TIP 2.5X950MM  351.706S

## (undated) DEVICE — SU ETHILON 3-0 FS-1 18" 669H

## (undated) RX ORDER — FENTANYL CITRATE 50 UG/ML
INJECTION, SOLUTION INTRAMUSCULAR; INTRAVENOUS
Status: DISPENSED
Start: 2021-01-04

## (undated) RX ORDER — FENTANYL CITRATE 0.05 MG/ML
INJECTION, SOLUTION INTRAMUSCULAR; INTRAVENOUS
Status: DISPENSED
Start: 2021-01-04

## (undated) RX ORDER — HYDROMORPHONE HYDROCHLORIDE 1 MG/ML
INJECTION, SOLUTION INTRAMUSCULAR; INTRAVENOUS; SUBCUTANEOUS
Status: DISPENSED
Start: 2021-01-04

## (undated) RX ORDER — PROPOFOL 10 MG/ML
INJECTION, EMULSION INTRAVENOUS
Status: DISPENSED
Start: 2021-01-04

## (undated) RX ORDER — ONDANSETRON 2 MG/ML
INJECTION INTRAMUSCULAR; INTRAVENOUS
Status: DISPENSED
Start: 2021-01-04

## (undated) RX ORDER — CEFAZOLIN SODIUM 2 G/100ML
INJECTION, SOLUTION INTRAVENOUS
Status: DISPENSED
Start: 2021-01-04

## (undated) RX ORDER — KETAMINE HCL IN NACL, ISO-OSM 100MG/10ML
SYRINGE (ML) INJECTION
Status: DISPENSED
Start: 2021-01-04

## (undated) RX ORDER — VANCOMYCIN HYDROCHLORIDE 1 G/20ML
INJECTION, POWDER, LYOPHILIZED, FOR SOLUTION INTRAVENOUS
Status: DISPENSED
Start: 2021-01-04

## (undated) RX ORDER — KETOROLAC TROMETHAMINE 30 MG/ML
INJECTION, SOLUTION INTRAMUSCULAR; INTRAVENOUS
Status: DISPENSED
Start: 2021-01-04

## (undated) RX ORDER — DEXAMETHASONE SODIUM PHOSPHATE 4 MG/ML
INJECTION, SOLUTION INTRA-ARTICULAR; INTRALESIONAL; INTRAMUSCULAR; INTRAVENOUS; SOFT TISSUE
Status: DISPENSED
Start: 2021-01-04

## (undated) RX ORDER — CEFAZOLIN SODIUM 1 G/3ML
INJECTION, POWDER, FOR SOLUTION INTRAMUSCULAR; INTRAVENOUS
Status: DISPENSED
Start: 2021-01-04

## (undated) RX ORDER — BUPIVACAINE HYDROCHLORIDE AND EPINEPHRINE 5; 5 MG/ML; UG/ML
INJECTION, SOLUTION EPIDURAL; INTRACAUDAL; PERINEURAL
Status: DISPENSED
Start: 2021-01-04